# Patient Record
Sex: MALE | Race: OTHER | Employment: FULL TIME | URBAN - METROPOLITAN AREA
[De-identification: names, ages, dates, MRNs, and addresses within clinical notes are randomized per-mention and may not be internally consistent; named-entity substitution may affect disease eponyms.]

---

## 2017-04-27 ENCOUNTER — HOSPITAL ENCOUNTER (OUTPATIENT)
Age: 27
Discharge: HOME | End: 2017-04-27
Attending: EMERGENCY MEDICINE
Payer: COMMERCIAL

## 2017-04-27 ENCOUNTER — TELEPHONE (OUTPATIENT)
Dept: FAMILY MEDICINE CLINIC | Facility: CLINIC | Age: 27
End: 2017-04-27

## 2017-04-27 VITALS
DIASTOLIC BLOOD PRESSURE: 83 MMHG | SYSTOLIC BLOOD PRESSURE: 121 MMHG | TEMPERATURE: 99 F | HEIGHT: 72 IN | OXYGEN SATURATION: 98 % | BODY MASS INDEX: 30.07 KG/M2 | RESPIRATION RATE: 16 BRPM | HEART RATE: 82 BPM | WEIGHT: 222 LBS

## 2017-04-27 DIAGNOSIS — L01.00 IMPETIGO: Primary | ICD-10-CM

## 2017-04-27 PROCEDURE — 99213 OFFICE O/P EST LOW 20 MIN: CPT

## 2017-04-27 PROCEDURE — 99214 OFFICE O/P EST MOD 30 MIN: CPT

## 2017-04-27 NOTE — TELEPHONE ENCOUNTER
Actions Requested: seeking appt after 5p; pt advised [facility] today; [facility]  Situation/Background   Problem: umbilicus swelling.     Onset: swelling for 1 week, discharge began yesterday   Associated Symptoms: pt reports no improvement to

## 2017-04-27 NOTE — TELEPHONE ENCOUNTER
Patient states it is his belly button, swollen and it has a discharge, it is red and itchy. Patient asking what to do.   I tried to book a office visit but he needs something after 5p,

## 2017-04-27 NOTE — ED INITIAL ASSESSMENT (HPI)
Belly button was itchy for a week. Went to [facility] and came back and noticed belly button was infected and pus discharge. Just itchy now.

## 2017-04-28 NOTE — TELEPHONE ENCOUNTER
Follow up call placed to pt. Was seen in [facility], given some cream and told to follow up if no better by Monday 5/1. Pt verbalizes satisfaction with treatment plan, denies further questions.

## 2017-04-28 NOTE — ED PROVIDER NOTES
Patient Seen in: [facility] In [address] [place] [address]    History   Patient presents with: Infection    Stated Complaint: swollen belly button    HPI    17-year-old male presents for evaluation of swollen bellybutton.   Patient reports itching, ir %   O2 Device 04/27/17 0034 None (Room air)       Current:/83 mmHg  Pulse 82  Temp(Src) 98.5 °F (36.9 °C) (Oral)  Resp 16  Ht 182.9 cm (6')  Wt 100.699 kg  BMI 30.10 kg/m2  SpO2 98%        Physical Exam   Constitutional: He is oriented to person, octavia

## 2017-09-02 ENCOUNTER — NURSE TRIAGE (OUTPATIENT)
Dept: OTHER | Age: 27
End: 2017-09-02

## 2017-09-02 NOTE — TELEPHONE ENCOUNTER
Action Requested: Summary for Provider     []  Critical Lab, Recommendations Needed  [] Need Additional Advice  []   FYI    []   Need Orders  [] Need Medications Sent to Pharmacy  []  Other     SUMMARY:   Patient will go to the IC if needed over the weeken

## 2017-09-05 NOTE — TELEPHONE ENCOUNTER
Pt states that he is feeling much better. Symptoms have greatly improved. Pt did not need to go to the IC.

## 2018-03-02 ENCOUNTER — OFFICE VISIT (OUTPATIENT)
Dept: FAMILY MEDICINE CLINIC | Facility: CLINIC | Age: 28
End: 2018-03-02

## 2018-03-02 VITALS
SYSTOLIC BLOOD PRESSURE: 118 MMHG | DIASTOLIC BLOOD PRESSURE: 77 MMHG | TEMPERATURE: 98 F | BODY MASS INDEX: 31.22 KG/M2 | WEIGHT: 223 LBS | HEART RATE: 56 BPM | HEIGHT: 71 IN

## 2018-03-02 DIAGNOSIS — L03.316 CELLULITIS, UMBILICAL: Primary | ICD-10-CM

## 2018-03-02 PROCEDURE — 99212 OFFICE O/P EST SF 10 MIN: CPT | Performed by: FAMILY MEDICINE

## 2018-03-02 RX ORDER — MUPIROCIN CALCIUM 20 MG/G
CREAM TOPICAL
Qty: 15 G | Refills: 1 | Status: SHIPPED | OUTPATIENT
Start: 2018-03-02 | End: 2018-03-07 | Stop reason: CLARIF

## 2018-03-02 NOTE — PROGRESS NOTES
HPI:    Patient ID: Kandis Gutierrez is a 32year old male.     HPI  Patient presents with:  Abdomen/Flank Pain (GI/): belly bottem drainage with swelling for 3 wks   was in hot tub and swimming pools in [facility]  Review of Systems   Constitutional: N

## 2018-03-07 ENCOUNTER — TELEPHONE (OUTPATIENT)
Dept: FAMILY MEDICINE CLINIC | Facility: CLINIC | Age: 28
End: 2018-03-07

## 2018-03-07 NOTE — TELEPHONE ENCOUNTER
Pharmacy stts script needs to be changed to ointment because insurance will not cover cream        Mupirocin Calcium 2 % External Cream 15 g 1 3/2/2018    Sig :  Apply twice daily for up to 10 days     Route:   (none)     Order #:   [id number]

## 2018-03-23 ENCOUNTER — OFFICE VISIT (OUTPATIENT)
Dept: FAMILY MEDICINE CLINIC | Facility: CLINIC | Age: 28
End: 2018-03-23

## 2018-03-23 ENCOUNTER — LAB ENCOUNTER (OUTPATIENT)
Dept: LAB | Age: 28
End: 2018-03-23
Attending: FAMILY MEDICINE
Payer: MEDICAID

## 2018-03-23 VITALS
TEMPERATURE: 97 F | HEART RATE: 57 BPM | HEIGHT: 72.5 IN | SYSTOLIC BLOOD PRESSURE: 122 MMHG | WEIGHT: 220.5 LBS | BODY MASS INDEX: 29.54 KG/M2 | DIASTOLIC BLOOD PRESSURE: 82 MMHG

## 2018-03-23 DIAGNOSIS — Z00.00 ADULT GENERAL MEDICAL EXAM: ICD-10-CM

## 2018-03-23 DIAGNOSIS — Z23 NEED FOR VACCINATION: ICD-10-CM

## 2018-03-23 DIAGNOSIS — Z00.00 ADULT GENERAL MEDICAL EXAM: Primary | ICD-10-CM

## 2018-03-23 DIAGNOSIS — J30.1 SEASONAL ALLERGIC RHINITIS DUE TO POLLEN: ICD-10-CM

## 2018-03-23 DIAGNOSIS — K42.9 UMBILICAL HERNIA WITHOUT OBSTRUCTION AND WITHOUT GANGRENE: ICD-10-CM

## 2018-03-23 LAB
ALBUMIN SERPL BCP-MCNC: 4.5 G/DL (ref 3.5–4.8)
ALBUMIN/GLOB SERPL: 1.4 {RATIO} (ref 1–2)
ALP SERPL-CCNC: 82 U/L (ref 32–100)
ALT SERPL-CCNC: 28 U/L (ref 17–63)
ANION GAP SERPL CALC-SCNC: 10 MMOL/L (ref 0–18)
AST SERPL-CCNC: 22 U/L (ref 15–41)
BASOPHILS # BLD: 0 K/UL (ref 0–0.2)
BASOPHILS NFR BLD: 1 %
BILIRUB SERPL-MCNC: 0.6 MG/DL (ref 0.3–1.2)
BUN SERPL-MCNC: 12 MG/DL (ref 8–20)
BUN/CREAT SERPL: 11.5 (ref 10–20)
CALCIUM SERPL-MCNC: 9.6 MG/DL (ref 8.5–10.5)
CHLORIDE SERPL-SCNC: 105 MMOL/L (ref 95–110)
CHOLEST SERPL-MCNC: 163 MG/DL (ref 110–200)
CO2 SERPL-SCNC: 26 MMOL/L (ref 22–32)
CREAT SERPL-MCNC: 1.04 MG/DL (ref 0.5–1.5)
EOSINOPHIL # BLD: 0.3 K/UL (ref 0–0.7)
EOSINOPHIL NFR BLD: 5 %
ERYTHROCYTE [DISTWIDTH] IN BLOOD BY AUTOMATED COUNT: 14.2 % (ref 11–15)
GLOBULIN PLAS-MCNC: 3.2 G/DL (ref 2.5–3.7)
GLUCOSE SERPL-MCNC: 92 MG/DL (ref 70–99)
HCT VFR BLD AUTO: 46.4 % (ref 41–52)
HDLC SERPL-MCNC: 32 MG/DL
HGB BLD-MCNC: 15.6 G/DL (ref 13.5–17.5)
LDLC SERPL CALC-MCNC: 106 MG/DL (ref 0–99)
LYMPHOCYTES # BLD: 2 K/UL (ref 1–4)
LYMPHOCYTES NFR BLD: 35 %
MCH RBC QN AUTO: 30.3 PG (ref 27–32)
MCHC RBC AUTO-ENTMCNC: 33.7 G/DL (ref 32–37)
MCV RBC AUTO: 89.7 FL (ref 80–100)
MONOCYTES # BLD: 0.4 K/UL (ref 0–1)
MONOCYTES NFR BLD: 7 %
NEUTROPHILS # BLD AUTO: 2.9 K/UL (ref 1.8–7.7)
NEUTROPHILS NFR BLD: 52 %
NONHDLC SERPL-MCNC: 131 MG/DL
OSMOLALITY UR CALC.SUM OF ELEC: 291 MOSM/KG (ref 275–295)
PATIENT FASTING: YES
PLATELET # BLD AUTO: 219 K/UL (ref 140–400)
PMV BLD AUTO: 7 FL (ref 7.4–10.3)
POTASSIUM SERPL-SCNC: 4.7 MMOL/L (ref 3.3–5.1)
PROT SERPL-MCNC: 7.7 G/DL (ref 5.9–8.4)
RBC # BLD AUTO: 5.17 M/UL (ref 4.5–5.9)
SODIUM SERPL-SCNC: 141 MMOL/L (ref 136–144)
TRIGL SERPL-MCNC: 125 MG/DL (ref 1–149)
TSH SERPL-ACNC: 1.69 UIU/ML (ref 0.45–5.33)
WBC # BLD AUTO: 5.5 K/UL (ref 4–11)

## 2018-03-23 PROCEDURE — 80061 LIPID PANEL: CPT

## 2018-03-23 PROCEDURE — 99395 PREV VISIT EST AGE 18-39: CPT | Performed by: FAMILY MEDICINE

## 2018-03-23 PROCEDURE — 80053 COMPREHEN METABOLIC PANEL: CPT

## 2018-03-23 PROCEDURE — 84443 ASSAY THYROID STIM HORMONE: CPT

## 2018-03-23 PROCEDURE — 85025 COMPLETE CBC W/AUTO DIFF WBC: CPT

## 2018-03-23 PROCEDURE — 90471 IMMUNIZATION ADMIN: CPT | Performed by: FAMILY MEDICINE

## 2018-03-23 PROCEDURE — 90715 TDAP VACCINE 7 YRS/> IM: CPT | Performed by: FAMILY MEDICINE

## 2018-03-23 PROCEDURE — 36415 COLL VENOUS BLD VENIPUNCTURE: CPT

## 2018-03-23 PROCEDURE — 80050 GENERAL HEALTH PANEL: CPT

## 2018-03-23 NOTE — PROGRESS NOTES
Patient ID: Keira Crowell is a 32year old male. HPI  Patient presents with:  Physical: Here today for annual physical.    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     He states for some time he had an abnormality of his umbilicus but he 11/12/2013   GLUUR NEG 11/12/2013   BILUR NEG 11/12/2013   KETUR NEG 11/12/2013   BLOODURINE TR (A) 11/12/2013   PHURINE 5.0 11/12/2013   PROUR NEG 11/12/2013   UROBILINOGEN <2.0 11/12/2013   NITRITE NEG 11/12/2013   LEUUR SM (A) 11/12/2013   ASCACIDURINE and hematuria. Musculoskeletal: Negative for joint swelling. Skin: Negative for rash. Allergic/Immunologic: Positive for environmental allergies (zyrtec prn). Neurological: Negative for dizziness, seizures, syncope, numbness and headaches.    Hemato are normal. There is no hepatosplenomegaly. There is no tenderness. Umbilicus: He does have a umbilical hernia at the 7 o'clock position and this is tender to palpation. It does not reduce. There is no drainage, cellulitis, discharge at this time.   Lym patient/family member understands and agrees to plan. Miguel Estrada, DO  3/23/2018      .

## 2018-03-23 NOTE — PROGRESS NOTES
Rush Bearden is a 32year old male who was brought in for this visit. History was provided by the CAREGIVER.   HPI:   Patient presents with:  Physical: Here today for annual physical.      Immunizations    Immunization History  Administered no chest pain, no sports injuries; Other: ***    PHYSICAL EXAM:   /82   Pulse 57   Temp (!) 97.3 °F (36.3 °C) (Oral)   Ht 6' 0.5\" (1.842 m)   Wt 220 lb 8 oz (100 kg)   BMI 29.49 kg/m²   Body mass index is 29.49 kg/m².   Normalized BMI data available types were placed in this encounter.         3/23/2018  Amari Moyer DO

## 2018-04-05 ENCOUNTER — OFFICE VISIT (OUTPATIENT)
Dept: SURGERY | Facility: CLINIC | Age: 28
End: 2018-04-05

## 2018-04-05 VITALS — HEIGHT: 72 IN | BODY MASS INDEX: 30.2 KG/M2 | WEIGHT: 223 LBS

## 2018-04-05 DIAGNOSIS — R19.09 UMBILICAL SWELLING: Primary | ICD-10-CM

## 2018-04-05 PROCEDURE — 99212 OFFICE O/P EST SF 10 MIN: CPT | Performed by: SURGERY

## 2018-04-05 PROCEDURE — 99244 OFF/OP CNSLTJ NEW/EST MOD 40: CPT | Performed by: SURGERY

## 2018-04-06 NOTE — PROGRESS NOTES
History and Physical      Ruby Hilario is a 29year old male. HPI   Patient presents with:  Hernia: Pt referred by Dr. Estelle Desouza regarding possible umbilical hernia x 1 yr.   Pt states he was treated for umbilical infection last yr with topical crea comprehensive 10 point review of systems was completed. Pertinent positives and negatives noted in the the HPI. PHYSICAL EXAM   Body mass index is 30.24 kg/m². No LMP for male patient.   Constitutional: appears well hydrated alert and responsive no

## 2019-05-04 ENCOUNTER — OFFICE VISIT (OUTPATIENT)
Dept: FAMILY MEDICINE CLINIC | Facility: CLINIC | Age: 29
End: 2019-05-04
Payer: COMMERCIAL

## 2019-05-04 VITALS
DIASTOLIC BLOOD PRESSURE: 84 MMHG | TEMPERATURE: 98 F | HEART RATE: 64 BPM | SYSTOLIC BLOOD PRESSURE: 136 MMHG | WEIGHT: 218 LBS | BODY MASS INDEX: 29.53 KG/M2 | HEIGHT: 72 IN

## 2019-05-04 DIAGNOSIS — J30.1 SEASONAL ALLERGIC RHINITIS DUE TO POLLEN: Primary | ICD-10-CM

## 2019-05-04 DIAGNOSIS — J02.9 SORE THROAT: ICD-10-CM

## 2019-05-04 DIAGNOSIS — R05.9 COUGH: ICD-10-CM

## 2019-05-04 PROCEDURE — 87880 STREP A ASSAY W/OPTIC: CPT | Performed by: NURSE PRACTITIONER

## 2019-05-04 PROCEDURE — 99213 OFFICE O/P EST LOW 20 MIN: CPT | Performed by: NURSE PRACTITIONER

## 2019-05-04 RX ORDER — LEVOCETIRIZINE DIHYDROCHLORIDE 5 MG/1
5 TABLET, FILM COATED ORAL EVERY EVENING
Qty: 90 TABLET | Refills: 1 | Status: SHIPPED | OUTPATIENT
Start: 2019-05-04 | End: 2020-12-20

## 2019-05-04 NOTE — PROGRESS NOTES
ALLERGIC RHINITIS    -Take otc allergy medications as directed (over the counter, generic Claritin, Zyrtec or Allegra)    -use of fluticasone nasal spray as advised (Flonase)-this is now available as a generic, over the counter spray (fluticasone) if your 03/23/2019  Influenza Vaccine(Season Ended) due on 09/01/2019    No LMP for male patient. Past Medical History:   Diagnosis Date   • Myopia with astigmatism 2009   • Seasonal allergies        .   Past Surgical History:   Procedure Laterality Date   • Lap Physically abused: Not on file        Forced sexual activity: Not on file    Other Topics      Concerns:         Service: Not Asked        Blood Transfusions: Not Asked        Caffeine Concern: Yes          coffee, 1 cup daily        Occupationa behavior is normal. Judgment and thought content normal.       Assessment and Plan:  Problem List Items Addressed This Visit        Immune    Allergic rhinitis - Primary     Discontinue Zyrtec. Start Xyzal and flonase daily.            Relevant Medications

## 2020-12-20 ENCOUNTER — OFFICE VISIT (OUTPATIENT)
Dept: FAMILY MEDICINE CLINIC | Facility: CLINIC | Age: 30
End: 2020-12-20
Payer: COMMERCIAL

## 2020-12-20 VITALS
SYSTOLIC BLOOD PRESSURE: 139 MMHG | WEIGHT: 219 LBS | BODY MASS INDEX: 29.66 KG/M2 | RESPIRATION RATE: 16 BRPM | OXYGEN SATURATION: 99 % | HEIGHT: 72 IN | HEART RATE: 85 BPM | DIASTOLIC BLOOD PRESSURE: 75 MMHG | TEMPERATURE: 97 F

## 2020-12-20 DIAGNOSIS — R05.8 COUGH WITH EXPOSURE TO COVID-19 VIRUS: Primary | ICD-10-CM

## 2020-12-20 DIAGNOSIS — Z20.822 COUGH WITH EXPOSURE TO COVID-19 VIRUS: Primary | ICD-10-CM

## 2020-12-20 PROCEDURE — 3008F BODY MASS INDEX DOCD: CPT | Performed by: NURSE PRACTITIONER

## 2020-12-20 PROCEDURE — 99202 OFFICE O/P NEW SF 15 MIN: CPT | Performed by: NURSE PRACTITIONER

## 2020-12-20 PROCEDURE — 3075F SYST BP GE 130 - 139MM HG: CPT | Performed by: NURSE PRACTITIONER

## 2020-12-20 PROCEDURE — 3078F DIAST BP <80 MM HG: CPT | Performed by: NURSE PRACTITIONER

## 2020-12-20 RX ORDER — CETIRIZINE HYDROCHLORIDE 10 MG/1
10 TABLET ORAL DAILY
COMMUNITY

## 2020-12-20 NOTE — PATIENT INSTRUCTIONS
Adult Self-Care for Colds  Colds are caused by viruses. They can't be cured with antibiotics. But you can ease symptoms and support your body's efforts to heal itself.  No matter which symptoms you have, be sure to:  · Drink plenty of fluids (water or c · As your appetite returns, you can go back to your normal diet. Ask your healthcare provider if there are any foods you should not have.     When to call your healthcare provider  When you first notice symptoms, ask your provider if you should take antivir Quarantine (for anyone in close contact with someone who has COVID-19)  Anyone who has been in close contact with someone who has COVID-19 should quarantine at home for 14 days from the time of exposure and follow the below recommendations.   If you test po CDC continues to endorse quarantine for 14 days and recognizes that any quarantine shorter than 14 days balances reduced burden against a small possibility of spreading the virus. 10 Ways to Manage Your Health at Home      1.  Stay home from work, school If you have not been exposed or are not aware of an exposure to COVID-19 and are concerned about your symptoms, please contact your health care provider with any questions.     Home Isolation  If you have tested positive for COVID-19, you should remain unde Convalescent plasma is a component of blood that, in people who have recovered from COVID-19, contains antibodies against the virus.  The antibodies in plasma can be used as a treatment for patients in our community who are most severely affected by the vir

## 2020-12-20 NOTE — PROGRESS NOTES
CHIEF COMPLAINT:   Patient presents with:  Covid: COVID exposure with mild symptoms - Entered by patient      HPI:   Carlin Goins is a 27year old male who presents for covid testing. Was exposed 7 days ago.   Reports normal allergy symptoms that i SKIN: no rashes or abnormal skin lesions  HEENT: See HPI  LUNGS: See HPI  CARDIOVASCULAR: denies chest pain or palpitations   GI: denies N/V/C or abdominal pain      EXAM:   /75   Pulse 85   Temp 96.5 °F (35.8 °C) (Tympanic)   Resp 16   Ht 6' (1.829 Colds are caused by viruses. They can't be cured with antibiotics. But you can ease symptoms and support your body's efforts to heal itself.  No matter which symptoms you have, be sure to:  · Drink plenty of fluids (water or clear soup)  · Stop smoking and When to call your healthcare provider  When you first notice symptoms, ask your provider if you should take antiviral medicines. Antibiotics should not be taken for colds or flu.  Also call your provider if you have any of these symptoms:  · You don't feel Anyone who has been in close contact with someone who has COVID-19 should quarantine at home for 14 days from the time of exposure and follow the below recommendations.   If you test positive for COVID-19, you should notify your family and friends with whom CDC continues to endorse quarantine for 14 days and recognizes that any quarantine shorter than 14 days balances reduced burden against a small possibility of spreading the virus. 10 Ways to Manage Your Health at Home      1.  Stay home from work, school If you have not been exposed or are not aware of an exposure to COVID-19 and are concerned about your symptoms, please contact your health care provider with any questions.     Home Isolation  If you have tested positive for COVID-19, you should remain unde Convalescent plasma is a component of blood that, in people who have recovered from COVID-19, contains antibodies against the virus.  The antibodies in plasma can be used as a treatment for patients in our community who are most severely affected by the vir

## 2021-03-09 DIAGNOSIS — Z23 NEED FOR VACCINATION: ICD-10-CM

## 2021-03-10 ENCOUNTER — IMMUNIZATION (OUTPATIENT)
Dept: LAB | Age: 31
End: 2021-03-10
Attending: HOSPITALIST
Payer: COMMERCIAL

## 2021-03-10 DIAGNOSIS — Z23 NEED FOR VACCINATION: Primary | ICD-10-CM

## 2021-03-10 PROCEDURE — 0001A SARSCOV2 VAC 30MCG/0.3ML IM: CPT

## 2021-03-31 ENCOUNTER — IMMUNIZATION (OUTPATIENT)
Dept: LAB | Age: 31
End: 2021-03-31
Attending: HOSPITALIST
Payer: COMMERCIAL

## 2021-03-31 DIAGNOSIS — Z23 NEED FOR VACCINATION: Primary | ICD-10-CM

## 2021-03-31 PROCEDURE — 0002A SARSCOV2 VAC 30MCG/0.3ML IM: CPT

## 2021-11-15 ENCOUNTER — IMMUNIZATION (OUTPATIENT)
Dept: LAB | Facility: HOSPITAL | Age: 31
End: 2021-11-15
Attending: EMERGENCY MEDICINE
Payer: COMMERCIAL

## 2021-11-15 DIAGNOSIS — Z23 NEED FOR VACCINATION: Primary | ICD-10-CM

## 2021-11-15 PROCEDURE — 0004A SARSCOV2 VAC 30MCG/0.3ML IM: CPT

## 2021-12-29 ENCOUNTER — TELEPHONE (OUTPATIENT)
Dept: FAMILY MEDICINE CLINIC | Facility: CLINIC | Age: 31
End: 2021-12-29

## 2021-12-29 DIAGNOSIS — Z11.52 ENCOUNTER FOR SCREENING FOR COVID-19: Primary | ICD-10-CM

## 2021-12-30 ENCOUNTER — LAB ENCOUNTER (OUTPATIENT)
Dept: LAB | Age: 31
End: 2021-12-30
Attending: FAMILY MEDICINE
Payer: COMMERCIAL

## 2021-12-30 DIAGNOSIS — Z11.52 ENCOUNTER FOR SCREENING FOR COVID-19: ICD-10-CM

## 2022-01-01 LAB — SARS-COV-2 RNA RESP QL NAA+PROBE: NOT DETECTED

## 2022-07-15 ENCOUNTER — OFFICE VISIT (OUTPATIENT)
Dept: FAMILY MEDICINE CLINIC | Facility: CLINIC | Age: 32
End: 2022-07-15
Payer: COMMERCIAL

## 2022-07-15 ENCOUNTER — NURSE TRIAGE (OUTPATIENT)
Dept: FAMILY MEDICINE CLINIC | Facility: CLINIC | Age: 32
End: 2022-07-15

## 2022-07-15 VITALS
WEIGHT: 232 LBS | HEIGHT: 72 IN | DIASTOLIC BLOOD PRESSURE: 86 MMHG | HEART RATE: 54 BPM | BODY MASS INDEX: 31.42 KG/M2 | SYSTOLIC BLOOD PRESSURE: 143 MMHG

## 2022-07-15 DIAGNOSIS — B02.9 HERPES ZOSTER WITHOUT COMPLICATION: Primary | ICD-10-CM

## 2022-07-15 PROCEDURE — 3079F DIAST BP 80-89 MM HG: CPT | Performed by: FAMILY MEDICINE

## 2022-07-15 PROCEDURE — 99213 OFFICE O/P EST LOW 20 MIN: CPT | Performed by: FAMILY MEDICINE

## 2022-07-15 PROCEDURE — 3008F BODY MASS INDEX DOCD: CPT | Performed by: FAMILY MEDICINE

## 2022-07-15 PROCEDURE — 3077F SYST BP >= 140 MM HG: CPT | Performed by: FAMILY MEDICINE

## 2022-07-15 RX ORDER — VALACYCLOVIR HYDROCHLORIDE 1 G/1
TABLET, FILM COATED ORAL
Qty: 21 TABLET | Refills: 0 | Status: SHIPPED | OUTPATIENT
Start: 2022-07-15

## 2022-07-15 NOTE — PROGRESS NOTES
Blood pressure 143/86, pulse 54, height 6' (1.829 m), weight 232 lb (105.2 kg). Complaining of a cluster of pimples he noticed around his back and left side. Noticed them yesterday.     Objective grouped vesicles noted T11 dermatome left    Assessment shingles    Plan Valtrex printed information given follow-up if no improvement

## 2022-07-25 ENCOUNTER — TELEPHONE (OUTPATIENT)
Dept: FAMILY MEDICINE CLINIC | Facility: CLINIC | Age: 32
End: 2022-07-25

## 2022-07-25 NOTE — TELEPHONE ENCOUNTER
Dr. Hussain Andres will be off from 8/8-8/15 he can schedule with Talib Serrano for a sooner apt if he prefers. Please call and inform patient. Thank you!

## 2022-08-03 ENCOUNTER — OFFICE VISIT (OUTPATIENT)
Dept: FAMILY MEDICINE CLINIC | Facility: CLINIC | Age: 32
End: 2022-08-03
Payer: COMMERCIAL

## 2022-08-03 VITALS
HEIGHT: 72 IN | DIASTOLIC BLOOD PRESSURE: 80 MMHG | SYSTOLIC BLOOD PRESSURE: 132 MMHG | HEART RATE: 57 BPM | BODY MASS INDEX: 30.75 KG/M2 | WEIGHT: 227 LBS

## 2022-08-03 DIAGNOSIS — G47.9 SLEEP DISTURBANCE: ICD-10-CM

## 2022-08-03 DIAGNOSIS — R03.0 ELEVATED BLOOD PRESSURE READING: ICD-10-CM

## 2022-08-03 DIAGNOSIS — Z56.6 STRESS AT WORK: ICD-10-CM

## 2022-08-03 DIAGNOSIS — Z00.00 WELL ADULT EXAM: Primary | ICD-10-CM

## 2022-08-03 PROCEDURE — 3075F SYST BP GE 130 - 139MM HG: CPT | Performed by: NURSE PRACTITIONER

## 2022-08-03 PROCEDURE — 99395 PREV VISIT EST AGE 18-39: CPT | Performed by: NURSE PRACTITIONER

## 2022-08-03 PROCEDURE — 3008F BODY MASS INDEX DOCD: CPT | Performed by: NURSE PRACTITIONER

## 2022-08-03 PROCEDURE — 3079F DIAST BP 80-89 MM HG: CPT | Performed by: NURSE PRACTITIONER

## 2022-08-03 SDOH — HEALTH STABILITY - MENTAL HEALTH: OTHER PHYSICAL AND MENTAL STRAIN RELATED TO WORK: Z56.6

## 2022-08-06 PROBLEM — Z56.6 STRESS AT WORK: Status: ACTIVE | Noted: 2022-08-06

## 2022-08-06 PROBLEM — R03.0 ELEVATED BLOOD PRESSURE READING: Status: ACTIVE | Noted: 2022-08-06

## 2022-08-06 PROBLEM — G47.9 SLEEP DISTURBANCE: Status: ACTIVE | Noted: 2022-08-06

## 2022-08-06 NOTE — ASSESSMENT & PLAN NOTE
Encourage stress reduction  Encourage good sleep hygiene  Please call if symptoms worsen or are not resolving.

## 2022-08-06 NOTE — ASSESSMENT & PLAN NOTE
Check bp occasionally  Encourage stress reduction  Please aim to eat a diet high in fresh fruits and vegetables, lean protein sources, complex carbohydrates and limited processed and fast foods. Try to get at least 150 minutes of exercise per week-a combination of weight resistance and cardio is preferred.

## 2022-08-06 NOTE — ASSESSMENT & PLAN NOTE
Screening labs  Please aim to eat a diet high in fresh fruits and vegetables, lean protein sources, complex carbohydrates and limited processed and fast foods. Try to get at least 150 minutes of exercise per week-a combination of weight resistance and cardio is preferred.     Up to date vaccines  Encourage stress reduction

## 2022-11-30 ENCOUNTER — TELEMEDICINE (OUTPATIENT)
Dept: TELEHEALTH | Age: 32
End: 2022-11-30

## 2022-11-30 ENCOUNTER — E-VISIT (OUTPATIENT)
Dept: TELEHEALTH | Age: 32
End: 2022-11-30

## 2022-11-30 ENCOUNTER — OFFICE VISIT (OUTPATIENT)
Dept: FAMILY MEDICINE CLINIC | Facility: CLINIC | Age: 32
End: 2022-11-30
Payer: COMMERCIAL

## 2022-11-30 VITALS
HEIGHT: 72 IN | BODY MASS INDEX: 31.15 KG/M2 | DIASTOLIC BLOOD PRESSURE: 81 MMHG | WEIGHT: 230 LBS | OXYGEN SATURATION: 99 % | TEMPERATURE: 99 F | SYSTOLIC BLOOD PRESSURE: 128 MMHG | RESPIRATION RATE: 20 BRPM | HEART RATE: 95 BPM

## 2022-11-30 DIAGNOSIS — R68.89 FLU-LIKE SYMPTOMS: Primary | ICD-10-CM

## 2022-11-30 DIAGNOSIS — H10.32 ACUTE CONJUNCTIVITIS OF LEFT EYE, UNSPECIFIED ACUTE CONJUNCTIVITIS TYPE: ICD-10-CM

## 2022-11-30 DIAGNOSIS — Z02.9 ADMINISTRATIVE ENCOUNTER: Primary | ICD-10-CM

## 2022-11-30 DIAGNOSIS — Z02.9 ENCOUNTERS FOR ADMINISTRATIVE PURPOSE: Primary | ICD-10-CM

## 2022-11-30 PROCEDURE — 3074F SYST BP LT 130 MM HG: CPT | Performed by: NURSE PRACTITIONER

## 2022-11-30 PROCEDURE — 99213 OFFICE O/P EST LOW 20 MIN: CPT | Performed by: NURSE PRACTITIONER

## 2022-11-30 PROCEDURE — 3079F DIAST BP 80-89 MM HG: CPT | Performed by: NURSE PRACTITIONER

## 2022-11-30 PROCEDURE — 3008F BODY MASS INDEX DOCD: CPT | Performed by: NURSE PRACTITIONER

## 2022-11-30 PROCEDURE — 87637 SARSCOV2&INF A&B&RSV AMP PRB: CPT | Performed by: NURSE PRACTITIONER

## 2022-11-30 RX ORDER — OFLOXACIN 3 MG/ML
1 SOLUTION/ DROPS OPHTHALMIC 4 TIMES DAILY
Qty: 1 EACH | Refills: 0 | Status: SHIPPED | OUTPATIENT
Start: 2022-11-30

## 2022-11-30 NOTE — PROGRESS NOTES
Patient completed an e-visit and it was recommended that he see his provider. Video visit timed out. No charge.

## 2022-12-01 LAB
FLUAV + FLUBV RNA SPEC NAA+PROBE: DETECTED
FLUAV + FLUBV RNA SPEC NAA+PROBE: NOT DETECTED
RSV RNA SPEC NAA+PROBE: NOT DETECTED
SARS-COV-2 RNA RESP QL NAA+PROBE: NOT DETECTED

## 2023-03-10 ENCOUNTER — HOSPITAL ENCOUNTER (EMERGENCY)
Facility: HOSPITAL | Age: 33
Discharge: HOME | End: 2023-03-10
Attending: EMERGENCY MEDICINE
Payer: COMMERCIAL

## 2023-03-10 ENCOUNTER — APPOINTMENT (OUTPATIENT)
Dept: MRI IMAGING | Facility: HOSPITAL | Age: 33
End: 2023-03-10
Attending: EMERGENCY MEDICINE
Payer: COMMERCIAL

## 2023-03-10 ENCOUNTER — OFFICE VISIT (OUTPATIENT)
Dept: FAMILY MEDICINE CLINIC | Facility: CLINIC | Age: 33
End: 2023-03-10
Payer: COMMERCIAL

## 2023-03-10 VITALS
TEMPERATURE: 98 F | RESPIRATION RATE: 18 BRPM | HEIGHT: 72 IN | OXYGEN SATURATION: 97 % | HEART RATE: 72 BPM | BODY MASS INDEX: 31.15 KG/M2 | WEIGHT: 230 LBS | SYSTOLIC BLOOD PRESSURE: 140 MMHG | DIASTOLIC BLOOD PRESSURE: 99 MMHG

## 2023-03-10 VITALS
BODY MASS INDEX: 31.15 KG/M2 | DIASTOLIC BLOOD PRESSURE: 82 MMHG | OXYGEN SATURATION: 98 % | HEIGHT: 72 IN | HEART RATE: 60 BPM | TEMPERATURE: 98 F | SYSTOLIC BLOOD PRESSURE: 110 MMHG | WEIGHT: 230 LBS | RESPIRATION RATE: 16 BRPM

## 2023-03-10 DIAGNOSIS — R93.0 ABNORMAL MRI OF HEAD: ICD-10-CM

## 2023-03-10 DIAGNOSIS — R42 DIZZINESS: ICD-10-CM

## 2023-03-10 DIAGNOSIS — R20.0 LEFT FACIAL NUMBNESS: ICD-10-CM

## 2023-03-10 DIAGNOSIS — Z02.9 ADMINISTRATIVE ENCOUNTER: Primary | ICD-10-CM

## 2023-03-10 DIAGNOSIS — R20.2 PARESTHESIAS: Primary | ICD-10-CM

## 2023-03-10 LAB
ALBUMIN SERPL-MCNC: 4 G/DL (ref 3.4–5)
ALBUMIN/GLOB SERPL: 1 {RATIO} (ref 1–2)
ALP LIVER SERPL-CCNC: 89 U/L
ALT SERPL-CCNC: 40 U/L
ANION GAP SERPL CALC-SCNC: 3 MMOL/L (ref 0–18)
AST SERPL-CCNC: 24 U/L (ref 15–37)
BASOPHILS # BLD AUTO: 0.04 X10(3) UL (ref 0–0.2)
BASOPHILS NFR BLD AUTO: 0.6 %
BILIRUB SERPL-MCNC: 0.3 MG/DL (ref 0.1–2)
BUN BLD-MCNC: 21 MG/DL (ref 7–18)
BUN/CREAT SERPL: 16.7 (ref 10–20)
CALCIUM BLD-MCNC: 9.5 MG/DL (ref 8.5–10.1)
CHLORIDE SERPL-SCNC: 106 MMOL/L (ref 98–112)
CO2 SERPL-SCNC: 29 MMOL/L (ref 21–32)
CREAT BLD-MCNC: 1.26 MG/DL
DEPRECATED RDW RBC AUTO: 43.9 FL (ref 35.1–46.3)
EOSINOPHIL # BLD AUTO: 0.43 X10(3) UL (ref 0–0.7)
EOSINOPHIL NFR BLD AUTO: 6.7 %
ERYTHROCYTE [DISTWIDTH] IN BLOOD BY AUTOMATED COUNT: 13.2 % (ref 11–15)
GFR SERPLBLD BASED ON 1.73 SQ M-ARVRAT: 78 ML/MIN/1.73M2 (ref 60–?)
GLOBULIN PLAS-MCNC: 4.1 G/DL (ref 2.8–4.4)
GLUCOSE BLD-MCNC: 108 MG/DL (ref 70–99)
GLUCOSE BLDC GLUCOMTR-MCNC: 88 MG/DL (ref 70–99)
HCT VFR BLD AUTO: 43.8 %
HGB BLD-MCNC: 14.8 G/DL
IMM GRANULOCYTES # BLD AUTO: 0.02 X10(3) UL (ref 0–1)
IMM GRANULOCYTES NFR BLD: 0.3 %
LYMPHOCYTES # BLD AUTO: 2.43 X10(3) UL (ref 1–4)
LYMPHOCYTES NFR BLD AUTO: 37.6 %
MCH RBC QN AUTO: 30.5 PG (ref 26–34)
MCHC RBC AUTO-ENTMCNC: 33.8 G/DL (ref 31–37)
MCV RBC AUTO: 90.1 FL
MONOCYTES # BLD AUTO: 0.51 X10(3) UL (ref 0.1–1)
MONOCYTES NFR BLD AUTO: 7.9 %
NEUTROPHILS # BLD AUTO: 3.03 X10 (3) UL (ref 1.5–7.7)
NEUTROPHILS # BLD AUTO: 3.03 X10(3) UL (ref 1.5–7.7)
NEUTROPHILS NFR BLD AUTO: 46.9 %
OSMOLALITY SERPL CALC.SUM OF ELEC: 290 MOSM/KG (ref 275–295)
PLATELET # BLD AUTO: 233 10(3)UL (ref 150–450)
POTASSIUM SERPL-SCNC: 4.2 MMOL/L (ref 3.5–5.1)
PROT SERPL-MCNC: 8.1 G/DL (ref 6.4–8.2)
RBC # BLD AUTO: 4.86 X10(6)UL
SODIUM SERPL-SCNC: 138 MMOL/L (ref 136–145)
TROPONIN I HIGH SENSITIVITY: 16 NG/L
WBC # BLD AUTO: 6.5 X10(3) UL (ref 4–11)

## 2023-03-10 PROCEDURE — 99284 EMERGENCY DEPT VISIT MOD MDM: CPT

## 2023-03-10 PROCEDURE — 84484 ASSAY OF TROPONIN QUANT: CPT | Performed by: EMERGENCY MEDICINE

## 2023-03-10 PROCEDURE — 99285 EMERGENCY DEPT VISIT HI MDM: CPT

## 2023-03-10 PROCEDURE — 3008F BODY MASS INDEX DOCD: CPT | Performed by: NURSE PRACTITIONER

## 2023-03-10 PROCEDURE — 3079F DIAST BP 80-89 MM HG: CPT | Performed by: NURSE PRACTITIONER

## 2023-03-10 PROCEDURE — 70551 MRI BRAIN STEM W/O DYE: CPT | Performed by: EMERGENCY MEDICINE

## 2023-03-10 PROCEDURE — 82962 GLUCOSE BLOOD TEST: CPT

## 2023-03-10 PROCEDURE — 80053 COMPREHEN METABOLIC PANEL: CPT | Performed by: EMERGENCY MEDICINE

## 2023-03-10 PROCEDURE — 3074F SYST BP LT 130 MM HG: CPT | Performed by: NURSE PRACTITIONER

## 2023-03-10 PROCEDURE — 96374 THER/PROPH/DIAG INJ IV PUSH: CPT

## 2023-03-10 PROCEDURE — 85025 COMPLETE CBC W/AUTO DIFF WBC: CPT | Performed by: EMERGENCY MEDICINE

## 2023-03-10 PROCEDURE — 93005 ELECTROCARDIOGRAM TRACING: CPT

## 2023-03-10 PROCEDURE — 93010 ELECTROCARDIOGRAM REPORT: CPT

## 2023-03-10 RX ORDER — DIAZEPAM 5 MG/ML
2.5 INJECTION, SOLUTION INTRAMUSCULAR; INTRAVENOUS ONCE
Status: COMPLETED | OUTPATIENT
Start: 2023-03-10 | End: 2023-03-10

## 2023-03-10 RX ORDER — ASPIRIN 81 MG/1
81 TABLET, CHEWABLE ORAL ONCE
Status: COMPLETED | OUTPATIENT
Start: 2023-03-10 | End: 2023-03-10

## 2023-03-11 NOTE — PROGRESS NOTES
Zeny Harmon is a 28year old male with hx of anxiety who presents to [facility] reporting starting 2-3 hours ago while he was shopping at [employer], he suddenly felt lightheaded (more of a dizziness sensation as if he was intoxicated), \"crossed vision\", nausea, and that a portion of his left face was feeling numb. He did eat a good breakfast but admits hasn't eaten much today, lower appetite. Has still been drinking fluids, urination is normal.  Denies dyspnea, SOB, chest pain, ear pain, syncope/pre-syncope. Denies drug/alcohol use. He was sick with a cold/flu this past week but he is better now. Home COVID test neg. Denies any other chronic medical conditions, no medications. Reports these do not feel like his typical anxiety symptoms. Upon triage, pt is stable/no distress. A&Ox3. RRR without murmur. Lungs CTA, no cough. PERRLA. Cranial nerves grossly intact. Speech and gait appropriate. Vitals stable. Accompanied by: Spouse  After triage, higher acuity of care was recommended to Zeny Harmon today. Rationale: Symptoms warrant labs, possible imaging. Site recommendation: [address] [place] [address] ED  Patient was offered transportation to hospital via EMS but declines. Spouse will drive pt and feels stable to do so. Patient/parent verbalized understanding of rationale for further evaluation and was stable upon discharge. Pt added to ED expectant arrivals board.   Electronically signed,   Jami Solis MSN, APN, FNP-BC  3/10/2023, 6:45 PM

## 2023-03-11 NOTE — CM/SW NOTE
2159:  Called by Dr. Glenn Epstein stating he spoke with Dr. Andres Ward whom requests to see patient in TIA clinic next week. Dr. Glenn Epstein informed to please have pt's RN confirm pt's contact number correct on face sheet and have RN inform patient, Lucy Arroyo will be contacting him on Monday with an expedited appointment in TIA clinic. 2240:  Krissy Menchaca called confirming pt's contact# confirmed [phone]. Krissy Menchaca informed to please inform patient Lucy Arroyo will be contacting him on Monday with an expedited appointment in TIA clinic. Katty Alexander RN v/u and will inform patient of the above.

## 2023-03-11 NOTE — ED INITIAL ASSESSMENT (HPI)
Aox4. Complaints of left sided facial numbness with dizziness (describes as feeling drunk but denies room spinning sensation; denies vertigo-like dizziness). Reports similar symptoms 2 weeks ago that resolved spontaneously. -trauma. BUE, BLE strength equal. No obvious facial droop. No tongue deviation. Ambulatory without difficulty. Speech clear.

## 2023-03-12 LAB
ATRIAL RATE: 61 BPM
P AXIS: 51 DEGREES
P-R INTERVAL: 152 MS
Q-T INTERVAL: 432 MS
QRS DURATION: 112 MS
QTC CALCULATION (BEZET): 434 MS
R AXIS: 3 DEGREES
T AXIS: 38 DEGREES
VENTRICULAR RATE: 61 BPM

## 2023-03-13 ENCOUNTER — TELEPHONE (OUTPATIENT)
Dept: NEUROLOGY | Facility: CLINIC | Age: 33
End: 2023-03-13

## 2023-03-13 DIAGNOSIS — G45.9 TIA (TRANSIENT ISCHEMIC ATTACK): Primary | ICD-10-CM

## 2023-03-13 NOTE — TELEPHONE ENCOUNTER
Patient was told by ER to call and get an appointment asap with Dr. Nadine Stone. Looks like Dr. Audree Cheadle was a Consult; should he be seeing Dr. Audree Cheadle? Should he be double booked for Dr. Audree Cheadle this week or if he should see Dr. Nadine Stone should it be for next week?

## 2023-03-13 NOTE — CM/SW NOTE
Called Dr Vladislav Bledsoe office and spoke with Bertha who stated she will reah out to the patient and schedule him for the [facility]

## 2023-03-13 NOTE — TELEPHONE ENCOUNTER
Received call from  in ED. MD would like pt scheduled for TIA clinic. Pt has not seen neuro previously. Seen in ED on 3/10/23. Patient scheduled for 3/14 with Dr Johnathon Spurling. Echo with bubble order placed.      Echo scheduled for 3/16/23 @ 1:15pm    Called patient to notify & provide with details for appointment

## 2023-03-14 ENCOUNTER — LAB ENCOUNTER (OUTPATIENT)
Dept: LAB | Facility: HOSPITAL | Age: 33
End: 2023-03-14
Attending: Other
Payer: COMMERCIAL

## 2023-03-14 ENCOUNTER — OFFICE VISIT (OUTPATIENT)
Dept: NEUROLOGY | Facility: CLINIC | Age: 33
End: 2023-03-14
Payer: COMMERCIAL

## 2023-03-14 VITALS
HEIGHT: 72 IN | BODY MASS INDEX: 31.15 KG/M2 | WEIGHT: 230 LBS | DIASTOLIC BLOOD PRESSURE: 80 MMHG | SYSTOLIC BLOOD PRESSURE: 122 MMHG

## 2023-03-14 DIAGNOSIS — R93.0 ABNORMAL MRI OF HEAD: ICD-10-CM

## 2023-03-14 DIAGNOSIS — R29.818 TRANSIENT NEUROLOGICAL SYMPTOMS: ICD-10-CM

## 2023-03-14 DIAGNOSIS — R29.818 TRANSIENT NEUROLOGICAL SYMPTOMS: Primary | ICD-10-CM

## 2023-03-14 LAB
HBV CORE AB SERPL QL IA: NONREACTIVE
HBV SURFACE AB SER QL: REACTIVE
HBV SURFACE AB SERPL IA-ACNC: 10.49 MIU/ML
HBV SURFACE AG SER-ACNC: <0.1 [IU]/L
HBV SURFACE AG SERPL QL IA: NONREACTIVE
IGA SERPL-MCNC: 190 MG/DL (ref 70–312)
IGM SERPL-MCNC: 186 MG/DL (ref 43–279)
IMMUNOGLOBULIN PNL SER-MCNC: 1380 MG/DL (ref 791–1643)

## 2023-03-14 PROCEDURE — 86704 HEP B CORE ANTIBODY TOTAL: CPT

## 2023-03-14 PROCEDURE — 82657 ENZYME CELL ACTIVITY: CPT

## 2023-03-14 PROCEDURE — 3008F BODY MASS INDEX DOCD: CPT | Performed by: OTHER

## 2023-03-14 PROCEDURE — 86711 JOHN CUNNINGHAM ANTIBODY: CPT

## 2023-03-14 PROCEDURE — 87340 HEPATITIS B SURFACE AG IA: CPT

## 2023-03-14 PROCEDURE — 86706 HEP B SURFACE ANTIBODY: CPT

## 2023-03-14 PROCEDURE — 3074F SYST BP LT 130 MM HG: CPT | Performed by: OTHER

## 2023-03-14 PROCEDURE — 99204 OFFICE O/P NEW MOD 45 MIN: CPT | Performed by: OTHER

## 2023-03-14 PROCEDURE — 82784 ASSAY IGA/IGD/IGG/IGM EACH: CPT

## 2023-03-14 PROCEDURE — 3079F DIAST BP 80-89 MM HG: CPT | Performed by: OTHER

## 2023-03-14 PROCEDURE — 80503 PATH CLIN CONSLTJ SF 5-20: CPT

## 2023-03-14 PROCEDURE — 36415 COLL VENOUS BLD VENIPUNCTURE: CPT

## 2023-03-15 ENCOUNTER — HOSPITAL ENCOUNTER (OUTPATIENT)
Dept: MRI IMAGING | Facility: HOSPITAL | Age: 33
Discharge: HOME | End: 2023-03-15
Attending: Other
Payer: COMMERCIAL

## 2023-03-15 ENCOUNTER — TELEPHONE (OUTPATIENT)
Dept: NEUROLOGY | Facility: CLINIC | Age: 33
End: 2023-03-15

## 2023-03-15 DIAGNOSIS — R93.0 ABNORMAL MRI OF HEAD: ICD-10-CM

## 2023-03-15 DIAGNOSIS — R29.818 TRANSIENT NEUROLOGICAL SYMPTOMS: ICD-10-CM

## 2023-03-15 PROCEDURE — 72156 MRI NECK SPINE W/O & W/DYE: CPT | Performed by: OTHER

## 2023-03-15 PROCEDURE — 70553 MRI BRAIN STEM W/O & W/DYE: CPT | Performed by: OTHER

## 2023-03-15 PROCEDURE — A9575 INJ GADOTERATE MEGLUMI 0.1ML: HCPCS | Performed by: OTHER

## 2023-03-15 RX ORDER — GADOTERATE MEGLUMINE 376.9 MG/ML
20 INJECTION INTRAVENOUS
Status: COMPLETED | OUTPATIENT
Start: 2023-03-15 | End: 2023-03-15

## 2023-03-15 RX ADMIN — GADOTERATE MEGLUMINE 20 ML: 376.9 INJECTION INTRAVENOUS at 15:37:00

## 2023-03-15 NOTE — TELEPHONE ENCOUNTER
----- Message from Amee Anders MD sent at 3/15/2023  7:35 AM CDT -----  Please let the patient know that that he is immunized against hepatitis B, but there is no current infection    Thank you

## 2023-03-15 NOTE — TELEPHONE ENCOUNTER
I spoke with Gracy Poe, and the patients MRI's have been rescheduled for today at the [facility] and tomorrow at the [address] [place] [address]. I spoke with the patient and he verbalized understanding. Information has been posted to the patients MyChart as well. Message to Dr. Dennie Boxer as Joanne Aranda. Reviewed and electronically signed by:  [address], [facility]

## 2023-03-16 ENCOUNTER — HOSPITAL ENCOUNTER (OUTPATIENT)
Dept: MRI IMAGING | Age: 33
End: 2023-03-16
Attending: Other
Payer: COMMERCIAL

## 2023-03-16 ENCOUNTER — HOSPITAL ENCOUNTER (OUTPATIENT)
Dept: CV DIAGNOSTICS | Facility: HOSPITAL | Age: 33
Discharge: HOME | End: 2023-03-16
Attending: Other
Payer: COMMERCIAL

## 2023-03-16 ENCOUNTER — TELEPHONE (OUTPATIENT)
Dept: ADMINISTRATIVE | Facility: HOSPITAL | Age: 33
End: 2023-03-16

## 2023-03-16 ENCOUNTER — TELEPHONE (OUTPATIENT)
Dept: NEUROLOGY | Facility: CLINIC | Age: 33
End: 2023-03-16

## 2023-03-16 DIAGNOSIS — G35 MS (MULTIPLE SCLEROSIS) (HCC): Primary | ICD-10-CM

## 2023-03-16 DIAGNOSIS — G45.9 TIA (TRANSIENT ISCHEMIC ATTACK): ICD-10-CM

## 2023-03-16 PROCEDURE — 93306 TTE W/DOPPLER COMPLETE: CPT | Performed by: OTHER

## 2023-03-16 NOTE — TELEPHONE ENCOUNTER
----- Message from Payal Ugarte MD sent at 3/16/2023  4:04 PM CDT -----  Please let the patient know that echocardiogram didn't show signs of the a. Fib or any holes that could have contributed to the TIA/stroke symptoms.

## 2023-03-16 NOTE — TELEPHONE ENCOUNTER
Can you clarify with them if there is any need for peer to peer discussion in the patient who has multiple sclerosis?

## 2023-03-16 NOTE — TELEPHONE ENCOUNTER
Detailed message has been left for the infusion center to contact the office. Colten Mclain start form has been completed. Reviewed and electronically signed by:  500 [facility], [address] [place] [address], [facility]

## 2023-03-16 NOTE — TELEPHONE ENCOUNTER
I spoke with the infusion center and they will add the patient on tomorrow. Message to Dr. Mani Abbasi to please place the order for the steroids. After the order has been placed, I will contact the patient. Reviewed and electronically signed by:  [address], [facility]

## 2023-03-16 NOTE — TELEPHONE ENCOUNTER
Could we find out if we can start steroid infusions today or tomorrow for the next 5 days, or is best to start him with oral steroids.     Meantime we need to start the process for applying for Chari Saint

## 2023-03-16 NOTE — TELEPHONE ENCOUNTER
The Brain MRI w+wo and C-Spine MRI w+wo was already completed yesterday 3/15/23    Contacted Jada at [place] who states P2P is required and can still be completed    Routing to [employer]

## 2023-03-16 NOTE — TELEPHONE ENCOUNTER
I spoke with the patient and informed him of the information below. The patient will come in today to sign the form. Reviewed and electronically signed by:  500 [facility], [address] [place] [address], [facility]

## 2023-03-16 NOTE — TELEPHONE ENCOUNTER
Detailed message has been left for the patient to contact the office. Order has been sent to the infusion center. The patient will need to come into the office to sign the K[employer] start form. Form has been placed in the nurse bin. Reviewed and electronically signed by:  [address], [facility]

## 2023-03-17 ENCOUNTER — PATIENT MESSAGE (OUTPATIENT)
Dept: NEUROLOGY | Facility: CLINIC | Age: 33
End: 2023-03-17

## 2023-03-17 PROBLEM — G35 MULTIPLE SCLEROSIS (HCC): Status: ACTIVE | Noted: 2023-03-17

## 2023-03-17 NOTE — TELEPHONE ENCOUNTER
Dr. Arroyo Chavo please review and advise. Reviewed and electronically signed by:  500 [facility], [address] [place] [address], [facility]

## 2023-03-17 NOTE — TELEPHONE ENCOUNTER
From: Frank Saldaña  To: Fahad Mena MD  Sent: 3/17/2023 9:55 AM CDT  Subject: Infusion / baby aspirin / questions    Good morning, just following up. I have not heard from scheduling for the infusion appointments. Just want to make sure nothing has changed. Also, the ER doctor prescribed a daily baby aspirin - should I continue to take daily? Is there any vitamins that i should start taking?

## 2023-03-17 NOTE — TELEPHONE ENCOUNTER
Received fax from the [facility] stating that we need to obtain a PA for the infusions. Contacted [facility] and spoke with [place]. PA is not required - Reference # [id number]. Information has sierra faxed to the [facility]. Reviewed and electronically signed by:  [address], [facility]

## 2023-03-17 NOTE — TELEPHONE ENCOUNTER
There is no need to take a daily aspirin. There is no need to be taking any supplements unless there is a proven deficiency. Multiple sclerosis is not caused by the vitamin deficiencies.   However if he wants, we can start him on multivitamin that includes vitamin D

## 2023-03-20 ENCOUNTER — PATIENT MESSAGE (OUTPATIENT)
Dept: NEUROLOGY | Facility: CLINIC | Age: 33
End: 2023-03-20

## 2023-03-20 ENCOUNTER — TELEPHONE (OUTPATIENT)
Dept: NEUROLOGY | Facility: CLINIC | Age: 33
End: 2023-03-20

## 2023-03-20 LAB — THIOPURINE METHYLTRANSFERASE: 29.7 U/ML

## 2023-03-20 RX ORDER — OFATUMUMAB 20 MG/.4ML
1 INJECTION, SOLUTION SUBCUTANEOUS
Qty: 1 EACH | Refills: 11 | Status: SHIPPED | OUTPATIENT
Start: 2023-03-20 | End: 2023-03-20

## 2023-03-20 RX ORDER — OFATUMUMAB 20 MG/.4ML
1 INJECTION, SOLUTION SUBCUTANEOUS WEEKLY
Qty: 3 EACH | Refills: 0 | Status: SHIPPED | OUTPATIENT
Start: 2023-03-20 | End: 2023-04-17

## 2023-03-20 RX ORDER — OFATUMUMAB 20 MG/.4ML
1 INJECTION, SOLUTION SUBCUTANEOUS
Qty: 1 EACH | Refills: 11 | Status: SHIPPED | OUTPATIENT
Start: 2023-03-20 | End: 2023-04-19

## 2023-03-20 NOTE — TELEPHONE ENCOUNTER
Recv'd call that the start form was received but Section 2 and Section 5 need to be check off and refaxed

## 2023-03-20 NOTE — TELEPHONE ENCOUNTER
From: Patricio Stafford  To: Kaleb Plummer MD  Sent: 3/20/2023 11:40 AM CDT  Subject: [employer] needs prior authorization form    Hello, i received a call from [employer] and mentioned an attempt to contact your office. [employer] is in need of the prior authorization (PA) form. Please return their call or submit the necessary form. Submit PA through [facility] or fax to [phone].      Benedicto Riojas

## 2023-03-20 NOTE — TELEPHONE ENCOUNTER
PA requested via Epic for Kesimpta loading dose and maintenance dosing. Reviewed and electronically signed by:  500 [facility], [address] [place] [address], [facility]

## 2023-03-22 ENCOUNTER — PATIENT MESSAGE (OUTPATIENT)
Dept: NEUROLOGY | Facility: CLINIC | Age: 33
End: 2023-03-22

## 2023-03-22 ENCOUNTER — OFFICE VISIT (OUTPATIENT)
Dept: HEMATOLOGY/ONCOLOGY | Facility: HOSPITAL | Age: 33
End: 2023-03-22
Attending: Other
Payer: COMMERCIAL

## 2023-03-22 VITALS
RESPIRATION RATE: 16 BRPM | TEMPERATURE: 98 F | HEART RATE: 61 BPM | SYSTOLIC BLOOD PRESSURE: 123 MMHG | DIASTOLIC BLOOD PRESSURE: 83 MMHG | OXYGEN SATURATION: 100 %

## 2023-03-22 DIAGNOSIS — G35 MULTIPLE SCLEROSIS (HCC): Primary | ICD-10-CM

## 2023-03-22 PROCEDURE — 96365 THER/PROPH/DIAG IV INF INIT: CPT

## 2023-03-22 NOTE — PROGRESS NOTES
Pt arrived for Solumedrol 1 of 5. He was recently diagnosed MS. Discussed with him the side effects of Solumedrol and the importance of coming everyday. He voiced understanding. He is waiting on Insurance Authorization for [employer]. PIV removed, he prefers to have a new one placed everyday.

## 2023-03-23 ENCOUNTER — OFFICE VISIT (OUTPATIENT)
Dept: HEMATOLOGY/ONCOLOGY | Facility: HOSPITAL | Age: 33
End: 2023-03-23
Attending: FAMILY MEDICINE
Payer: COMMERCIAL

## 2023-03-23 VITALS
HEART RATE: 56 BPM | DIASTOLIC BLOOD PRESSURE: 83 MMHG | OXYGEN SATURATION: 100 % | SYSTOLIC BLOOD PRESSURE: 130 MMHG | TEMPERATURE: 98 F | RESPIRATION RATE: 16 BRPM

## 2023-03-23 DIAGNOSIS — G35 MULTIPLE SCLEROSIS (HCC): Primary | ICD-10-CM

## 2023-03-23 PROCEDURE — 96365 THER/PROPH/DIAG IV INF INIT: CPT

## 2023-03-23 NOTE — TELEPHONE ENCOUNTER
Dr. Nicolás Najera, please review and advise. Thanks. Reviewed and electronically signed by:  500 [facility], [address] [place] [address], [facility]

## 2023-03-23 NOTE — TELEPHONE ENCOUNTER
From: Shelby Lopez  To: Michele Rojas MD  Sent: 3/22/2023 8:33 PM CDT  Subject: Covid and Flu Shot Vaccines     Hello, can you confirm i am up to date with my Covid vaccine? If not, should I boost prior to start of treatment. Also, should I get the flu shot? Let me know your thoughts on these vaccine shots. Thanks.

## 2023-03-23 NOTE — TELEPHONE ENCOUNTER
Would suggest another booster for COVID and shot for the flu.   Also please have him clarify with the PCP what else he is missing

## 2023-03-23 NOTE — PROGRESS NOTES
Pt to infusion for Solumedrol 2 of 5. Arrives ambulating independently. PIV established to left forearm on 2nd attempt - present blood return noted. Solumedrol infused per order, pt appeared to tolerate well. PIV removed per pt preference, site covered with gauze and coban. Discharged from infusion ambulating independently. Aware of future appointments.

## 2023-03-24 ENCOUNTER — IMMUNIZATION (OUTPATIENT)
Dept: LAB | Age: 33
End: 2023-03-24
Attending: EMERGENCY MEDICINE
Payer: COMMERCIAL

## 2023-03-24 ENCOUNTER — OFFICE VISIT (OUTPATIENT)
Dept: HEMATOLOGY/ONCOLOGY | Facility: HOSPITAL | Age: 33
End: 2023-03-24
Attending: Other
Payer: COMMERCIAL

## 2023-03-24 ENCOUNTER — TELEPHONE (OUTPATIENT)
Dept: NEUROLOGY | Facility: CLINIC | Age: 33
End: 2023-03-24

## 2023-03-24 VITALS
OXYGEN SATURATION: 100 % | HEART RATE: 55 BPM | SYSTOLIC BLOOD PRESSURE: 129 MMHG | TEMPERATURE: 99 F | RESPIRATION RATE: 16 BRPM | DIASTOLIC BLOOD PRESSURE: 75 MMHG

## 2023-03-24 DIAGNOSIS — Z23 NEED FOR VACCINATION: Primary | ICD-10-CM

## 2023-03-24 DIAGNOSIS — G35 MULTIPLE SCLEROSIS (HCC): Primary | ICD-10-CM

## 2023-03-24 PROCEDURE — 0124A SARSCOV2 VAC BVL 30MCG/0.3ML: CPT

## 2023-03-24 PROCEDURE — 90471 IMMUNIZATION ADMIN: CPT

## 2023-03-24 PROCEDURE — 96365 THER/PROPH/DIAG IV INF INIT: CPT

## 2023-03-24 PROCEDURE — 90686 IIV4 VACC NO PRSV 0.5 ML IM: CPT

## 2023-03-24 NOTE — TELEPHONE ENCOUNTER
----- Message from Gracy Buitrago MD sent at 3/24/2023  8:47 AM CDT -----  Please let the patient know that results of these particular lab tests so far were normal.    Thank you

## 2023-03-24 NOTE — TELEPHONE ENCOUNTER
I spoke with the patient and informed him of the results. Patient verbalized understanding. Reviewed and electronically signed by:  500 [facility], [address] [place] [address], [facility]

## 2023-03-24 NOTE — PROGRESS NOTES
Pt arrived for IV solumedrol 3 of 5 for MS. States he has tolerated past few days of treatment well but with c/o insomnia. PIV started with good blood return. Solumedrol given over 1 hr-tolerated well. PIV dc'd and pt left ambulating without complaints. Schedule updated for pt received 5/5 on Sunday instead of Monday-agreeable to plan. Aware of weekend procedure.

## 2023-03-25 ENCOUNTER — OFFICE VISIT (OUTPATIENT)
Dept: HEMATOLOGY/ONCOLOGY | Facility: HOSPITAL | Age: 33
End: 2023-03-25
Attending: Other
Payer: COMMERCIAL

## 2023-03-25 VITALS
DIASTOLIC BLOOD PRESSURE: 73 MMHG | RESPIRATION RATE: 16 BRPM | TEMPERATURE: 98 F | OXYGEN SATURATION: 98 % | HEART RATE: 56 BPM | SYSTOLIC BLOOD PRESSURE: 127 MMHG

## 2023-03-25 DIAGNOSIS — G35 MULTIPLE SCLEROSIS (HCC): Primary | ICD-10-CM

## 2023-03-25 PROCEDURE — 96365 THER/PROPH/DIAG IV INF INIT: CPT

## 2023-03-25 NOTE — PROGRESS NOTES
Pt here for IV solumerdol #4/5. Patient states he is still having some neurologic side effects from his MS but he does feel like they are decreasing with IV steroids. Ordering MD: Dr. Carmel Noriega     Pt tolerated infusion without difficulty or complaint. Reviewed next apt date/time.       Education Record    Learner:  Patient    Disease / Diagnosis: Multiple sclerosis    Barriers / Limitations:  None   Comments:    Method:  Discussion   Comments:    General Topics:  Plan of care reviewed   Comments:    Outcome:  Shows understanding   Comments:

## 2023-03-26 ENCOUNTER — OFFICE VISIT (OUTPATIENT)
Dept: HEMATOLOGY/ONCOLOGY | Facility: HOSPITAL | Age: 33
End: 2023-03-26
Attending: Other
Payer: COMMERCIAL

## 2023-03-26 ENCOUNTER — PATIENT MESSAGE (OUTPATIENT)
Dept: NEUROLOGY | Facility: CLINIC | Age: 33
End: 2023-03-26

## 2023-03-26 VITALS
SYSTOLIC BLOOD PRESSURE: 117 MMHG | HEART RATE: 50 BPM | DIASTOLIC BLOOD PRESSURE: 70 MMHG | RESPIRATION RATE: 16 BRPM | OXYGEN SATURATION: 100 % | TEMPERATURE: 98 F

## 2023-03-26 DIAGNOSIS — G35 MULTIPLE SCLEROSIS (HCC): Primary | ICD-10-CM

## 2023-03-26 PROCEDURE — 96365 THER/PROPH/DIAG IV INF INIT: CPT

## 2023-03-26 NOTE — PROGRESS NOTES
Pt here for IV solumedrol #5/5. Pt with some bruising noted to arms from IV starts. No other concerns at this time. Ordering MD: Dr. Connor Ro       Pt tolerated infusion without difficulty or complaint.     Education Record    Learner:  Patient    Disease / Diagnosis: Multiple sclerosis    Barriers / Limitations:  None   Comments:    Method:  Discussion   Comments:    General Topics:  Plan of care reviewed   Comments:    Outcome:  Shows understanding   Comments:

## 2023-03-27 ENCOUNTER — APPOINTMENT (OUTPATIENT)
Dept: HEMATOLOGY/ONCOLOGY | Facility: HOSPITAL | Age: 33
End: 2023-03-27
Attending: Other
Payer: COMMERCIAL

## 2023-03-27 NOTE — TELEPHONE ENCOUNTER
General healthy diet, for example Mediterranean diet is suggested. If he needs help designing the diet we can refer him to dietitian. Of course Cayla Saint will lower the immune system just like any other multiple sclerosis medication will. There are no supplements to boost immune system. Good diet, good exercise, maintaining hygiene and avoiding sick contacts. Generally we advise minimal amount of alcohol. But not because it is anything to do specifically with multiple sclerosis. [employer] has significant amount of information.

## 2023-03-27 NOTE — TELEPHONE ENCOUNTER
Please review and advise. Reviewed and electronically signed by:  500 [facility], [address] [place] [address], [facility]

## 2023-03-28 ENCOUNTER — PATIENT MESSAGE (OUTPATIENT)
Dept: NEUROLOGY | Facility: CLINIC | Age: 33
End: 2023-03-28

## 2023-03-29 NOTE — TELEPHONE ENCOUNTER
Please review and advise. Thanks. Reviewed and electronically signed by:  500 [facility], [address] [place] [address], [facility]

## 2023-04-18 ENCOUNTER — HOSPITAL ENCOUNTER (OUTPATIENT)
Dept: NUTRITION | Facility: HOSPITAL | Age: 33
Discharge: HOME | End: 2023-04-18
Attending: Other
Payer: COMMERCIAL

## 2023-04-18 DIAGNOSIS — G35 MS (MULTIPLE SCLEROSIS) (HCC): ICD-10-CM

## 2023-04-18 PROCEDURE — 97802 MEDICAL NUTRITION INDIV IN: CPT | Performed by: DIETITIAN, REGISTERED

## 2023-09-07 NOTE — TELEPHONE ENCOUNTER
1. \"Have you been to the ER, urgent care clinic since your last visit? Hospitalized since your last visit? \" No    2. \"Have you seen or consulted any other health care providers outside of the [address] [place] [address] since your last visit? \" No     3. For patients aged 43-73: Has the patient had a colonoscopy / FIT/ Cologuard? NA - based on age      If the patient is female:    4. For patients aged 43-66: Has the patient had a mammogram within the past 2 years? Yes - Care Gap present. Most recent result on file      5. For patients aged 21-65: Has the patient had a pap smear?  NA - based on age or sex I think that would be fine.

## 2023-10-24 ENCOUNTER — NURSE TRIAGE (OUTPATIENT)
Dept: FAMILY MEDICINE CLINIC | Facility: CLINIC | Age: 33
End: 2023-10-24

## 2023-10-24 ENCOUNTER — OFFICE VISIT (OUTPATIENT)
Dept: FAMILY MEDICINE CLINIC | Facility: CLINIC | Age: 33
End: 2023-10-24

## 2023-10-24 VITALS
WEIGHT: 179.5 LBS | BODY MASS INDEX: 24 KG/M2 | HEART RATE: 71 BPM | SYSTOLIC BLOOD PRESSURE: 131 MMHG | DIASTOLIC BLOOD PRESSURE: 86 MMHG

## 2023-10-24 DIAGNOSIS — R10.31 RIGHT LOWER QUADRANT ABDOMINAL PAIN: Primary | ICD-10-CM

## 2023-10-24 LAB
APPEARANCE: CLEAR
BILIRUBIN: NEGATIVE
GLUCOSE (URINE DIPSTICK): NEGATIVE MG/DL
KETONES (URINE DIPSTICK): NEGATIVE MG/DL
LEUKOCYTES: NEGATIVE
MULTISTIX LOT#: ABNORMAL NUMERIC
NITRITE, URINE: NEGATIVE
PH, URINE: 7 (ref 4.5–8)
PROTEIN (URINE DIPSTICK): NEGATIVE MG/DL
SPECIFIC GRAVITY: 1.01 (ref 1–1.03)
URINE-COLOR: YELLOW
UROBILINOGEN,SEMI-QN: 0.2 MG/DL (ref 0–1.9)

## 2023-10-24 PROCEDURE — 81002 URINALYSIS NONAUTO W/O SCOPE: CPT | Performed by: NURSE PRACTITIONER

## 2023-10-24 PROCEDURE — 3079F DIAST BP 80-89 MM HG: CPT | Performed by: NURSE PRACTITIONER

## 2023-10-24 PROCEDURE — 99213 OFFICE O/P EST LOW 20 MIN: CPT | Performed by: NURSE PRACTITIONER

## 2023-10-24 PROCEDURE — 3075F SYST BP GE 130 - 139MM HG: CPT | Performed by: NURSE PRACTITIONER

## 2023-10-24 RX ORDER — MULTIVIT-MIN/IRON/FOLIC ACID/K 18-600-40
CAPSULE ORAL
COMMUNITY

## 2023-10-24 RX ORDER — OFATUMUMAB 20 MG/.4ML
INJECTION, SOLUTION SUBCUTANEOUS
COMMUNITY
Start: 2023-07-12

## 2023-10-24 NOTE — TELEPHONE ENCOUNTER
Patient booked an appt via [employer] for the following symptoms:    Lower - mid (right side) side pain when urinating.

## 2023-10-24 NOTE — TELEPHONE ENCOUNTER
Patient had schedule 10/26/23. Discussed symptoms. Has No urinary frequency. If no force, no pain; has pain if he forces urine. Denied discharge, no blood in urine. No flank pain. Offered sooner appt today with Anne-Marie Abts 4:40pm   Agreed to come in sooner.        Reason for Disposition   All other males with painful urination, or patient wants to be seen    Protocols used: Urination Pain - Male-A-OH

## 2023-11-06 ENCOUNTER — TELEPHONE (OUTPATIENT)
Dept: FAMILY MEDICINE CLINIC | Facility: CLINIC | Age: 33
End: 2023-11-06

## 2023-11-06 NOTE — TELEPHONE ENCOUNTER
Patient called back advised him:    No further questions    our urine culture was negative for growth of bacteria.   Please follow up if your symptoms persist.

## 2023-12-08 ENCOUNTER — OFFICE VISIT (OUTPATIENT)
Dept: FAMILY MEDICINE CLINIC | Facility: CLINIC | Age: 33
End: 2023-12-08
Payer: COMMERCIAL

## 2023-12-08 VITALS
SYSTOLIC BLOOD PRESSURE: 118 MMHG | BODY MASS INDEX: 23.98 KG/M2 | HEIGHT: 72 IN | DIASTOLIC BLOOD PRESSURE: 77 MMHG | WEIGHT: 177 LBS | HEART RATE: 68 BPM | TEMPERATURE: 97 F

## 2023-12-08 DIAGNOSIS — E55.9 VITAMIN D DEFICIENCY: ICD-10-CM

## 2023-12-08 DIAGNOSIS — G35 MULTIPLE SCLEROSIS (HCC): ICD-10-CM

## 2023-12-08 DIAGNOSIS — Z23 INFLUENZA VACCINE NEEDED: ICD-10-CM

## 2023-12-08 DIAGNOSIS — Z00.00 ADULT GENERAL MEDICAL EXAM: Primary | ICD-10-CM

## 2023-12-08 DIAGNOSIS — J30.89 OTHER ALLERGIC RHINITIS: ICD-10-CM

## 2023-12-08 PROCEDURE — 3074F SYST BP LT 130 MM HG: CPT | Performed by: FAMILY MEDICINE

## 2023-12-08 PROCEDURE — 3078F DIAST BP <80 MM HG: CPT | Performed by: FAMILY MEDICINE

## 2023-12-08 PROCEDURE — 3008F BODY MASS INDEX DOCD: CPT | Performed by: FAMILY MEDICINE

## 2023-12-08 PROCEDURE — 90686 IIV4 VACC NO PRSV 0.5 ML IM: CPT | Performed by: FAMILY MEDICINE

## 2023-12-08 PROCEDURE — 99395 PREV VISIT EST AGE 18-39: CPT | Performed by: FAMILY MEDICINE

## 2023-12-08 PROCEDURE — 90471 IMMUNIZATION ADMIN: CPT | Performed by: FAMILY MEDICINE

## 2023-12-09 ENCOUNTER — LAB ENCOUNTER (OUTPATIENT)
Dept: LAB | Facility: HOSPITAL | Age: 33
End: 2023-12-09
Attending: FAMILY MEDICINE
Payer: COMMERCIAL

## 2023-12-09 DIAGNOSIS — E55.9 VITAMIN D DEFICIENCY: ICD-10-CM

## 2023-12-09 DIAGNOSIS — Z00.00 ADULT GENERAL MEDICAL EXAM: ICD-10-CM

## 2023-12-09 LAB
ALBUMIN SERPL-MCNC: 4.7 G/DL (ref 3.2–4.8)
ALBUMIN/GLOB SERPL: 1.7 {RATIO} (ref 1–2)
ALP LIVER SERPL-CCNC: 90 U/L
ALT SERPL-CCNC: 17 U/L
ANION GAP SERPL CALC-SCNC: 6 MMOL/L (ref 0–18)
AST SERPL-CCNC: 15 U/L (ref ?–34)
BASOPHILS # BLD AUTO: 0.03 X10(3) UL (ref 0–0.2)
BASOPHILS NFR BLD AUTO: 0.5 %
BILIRUB SERPL-MCNC: 0.6 MG/DL (ref 0.3–1.2)
BUN BLD-MCNC: 17 MG/DL (ref 9–23)
BUN/CREAT SERPL: 14.9 (ref 10–20)
CALCIUM BLD-MCNC: 9.9 MG/DL (ref 8.7–10.4)
CHLORIDE SERPL-SCNC: 107 MMOL/L (ref 98–112)
CHOLEST SERPL-MCNC: 124 MG/DL (ref ?–200)
CO2 SERPL-SCNC: 29 MMOL/L (ref 21–32)
CREAT BLD-MCNC: 1.14 MG/DL
DEPRECATED RDW RBC AUTO: 45.4 FL (ref 35.1–46.3)
EGFRCR SERPLBLD CKD-EPI 2021: 87 ML/MIN/1.73M2 (ref 60–?)
EOSINOPHIL # BLD AUTO: 0.27 X10(3) UL (ref 0–0.7)
EOSINOPHIL NFR BLD AUTO: 4.8 %
ERYTHROCYTE [DISTWIDTH] IN BLOOD BY AUTOMATED COUNT: 13.1 % (ref 11–15)
FASTING PATIENT LIPID ANSWER: YES
FASTING STATUS PATIENT QL REPORTED: YES
GLOBULIN PLAS-MCNC: 2.7 G/DL (ref 2.8–4.4)
GLUCOSE BLD-MCNC: 87 MG/DL (ref 70–99)
HCT VFR BLD AUTO: 42.8 %
HDLC SERPL-MCNC: 40 MG/DL (ref 40–59)
HGB BLD-MCNC: 13.8 G/DL
IMM GRANULOCYTES # BLD AUTO: 0.01 X10(3) UL (ref 0–1)
IMM GRANULOCYTES NFR BLD: 0.2 %
LDLC SERPL CALC-MCNC: 71 MG/DL (ref ?–100)
LYMPHOCYTES # BLD AUTO: 1.47 X10(3) UL (ref 1–4)
LYMPHOCYTES NFR BLD AUTO: 26.2 %
MCH RBC QN AUTO: 30.3 PG (ref 26–34)
MCHC RBC AUTO-ENTMCNC: 32.2 G/DL (ref 31–37)
MCV RBC AUTO: 94.1 FL
MONOCYTES # BLD AUTO: 0.51 X10(3) UL (ref 0.1–1)
MONOCYTES NFR BLD AUTO: 9.1 %
NEUTROPHILS # BLD AUTO: 3.33 X10 (3) UL (ref 1.5–7.7)
NEUTROPHILS # BLD AUTO: 3.33 X10(3) UL (ref 1.5–7.7)
NEUTROPHILS NFR BLD AUTO: 59.2 %
NONHDLC SERPL-MCNC: 84 MG/DL (ref ?–130)
OSMOLALITY SERPL CALC.SUM OF ELEC: 295 MOSM/KG (ref 275–295)
PLATELET # BLD AUTO: 267 10(3)UL (ref 150–450)
POTASSIUM SERPL-SCNC: 4.2 MMOL/L (ref 3.5–5.1)
PROT SERPL-MCNC: 7.4 G/DL (ref 5.7–8.2)
RBC # BLD AUTO: 4.55 X10(6)UL
SODIUM SERPL-SCNC: 142 MMOL/L (ref 136–145)
TRIGL SERPL-MCNC: 59 MG/DL (ref 30–149)
TSI SER-ACNC: 1.83 MIU/ML (ref 0.55–4.78)
VIT D+METAB SERPL-MCNC: 52.1 NG/ML (ref 30–100)
VLDLC SERPL CALC-MCNC: 9 MG/DL (ref 0–30)
WBC # BLD AUTO: 5.6 X10(3) UL (ref 4–11)

## 2023-12-09 PROCEDURE — 36415 COLL VENOUS BLD VENIPUNCTURE: CPT

## 2023-12-09 PROCEDURE — 80053 COMPREHEN METABOLIC PANEL: CPT

## 2023-12-09 PROCEDURE — 84443 ASSAY THYROID STIM HORMONE: CPT

## 2023-12-09 PROCEDURE — 82306 VITAMIN D 25 HYDROXY: CPT

## 2023-12-09 PROCEDURE — 80061 LIPID PANEL: CPT

## 2023-12-09 PROCEDURE — 85025 COMPLETE CBC W/AUTO DIFF WBC: CPT

## 2024-01-14 ENCOUNTER — IMMUNIZATION (OUTPATIENT)
Dept: LAB | Age: 34
End: 2024-01-14
Attending: EMERGENCY MEDICINE
Payer: COMMERCIAL

## 2024-01-14 DIAGNOSIS — Z23 NEED FOR VACCINATION: Primary | ICD-10-CM

## 2024-01-14 PROCEDURE — 90480 ADMN SARSCOV2 VAC 1/ONLY CMP: CPT

## 2024-03-12 ENCOUNTER — HOSPITAL ENCOUNTER (OUTPATIENT)
Age: 34
Discharge: HOME | End: 2024-03-12
Payer: COMMERCIAL

## 2024-03-12 VITALS
DIASTOLIC BLOOD PRESSURE: 81 MMHG | TEMPERATURE: 98 F | HEART RATE: 51 BPM | RESPIRATION RATE: 16 BRPM | SYSTOLIC BLOOD PRESSURE: 119 MMHG | OXYGEN SATURATION: 98 %

## 2024-03-12 PROCEDURE — 99213 OFFICE O/P EST LOW 20 MIN: CPT

## 2024-03-12 PROCEDURE — 99214 OFFICE O/P EST MOD 30 MIN: CPT

## 2024-03-12 NOTE — ED INITIAL ASSESSMENT (HPI)
Patient arrived ambulatory to room c/o a rash around the umbilicus. Patient states he was recently on vacation swimming. No drainage from area. No pain. No fevers.

## 2024-03-12 NOTE — ED PROVIDER NOTES
Chief Complaint   Patient presents with    Rash       HPI:     Frank Bolaños is a 33 year old male who presents for evaluation of mild skin changes along the umbilicus over the last few days.  Notes was [place] last [place] and developed reaction over a few days.  Notes previous history of this with traveling chlorine based pools needing a topical antibiotic a few years ago with improvement in symptoms.  Denies any history of complicated skin infections including MRSA or associated fevers chills headache dizziness abdominal pain vomiting diarrhea dysuria or rash.  Denies any drainage or discharge      PFSH    PFSH asessment screens reviewed and agree.  Nurses notes reviewed I agree with documentation.    Family History   Problem Relation Age of Onset    Lipids Father     Cancer Father         back tumor    Diabetes Maternal Grandmother     Stroke Other     Heart Disease Neg     Glaucoma Neg     Macular degeneration Neg     Retinal detachment Neg      Family history reviewed with patient/caregiver and is not pertinent to presenting problem.  Social History     Socioeconomic History    Marital status:      Spouse name: Not on file    Number of children: 1    Years of education: Not on file    Highest education level: Not on file   Occupational History    Occupation:    Tobacco Use    Smoking status: Never    Smokeless tobacco: Never    Tobacco comments:     Socially    Substance and Sexual Activity    Alcohol use: Yes     Alcohol/week: 0.0 standard drinks of alcohol     Comment: weekends    Drug use: No    Sexual activity: Not on file   Other Topics Concern     Service Not Asked    Blood Transfusions Not Asked    Caffeine Concern Yes     Comment: coffee, 1 cup daily    Occupational Exposure Not Asked    Hobby Hazards Not Asked    Sleep Concern Not Asked    Stress Concern Not Asked    Weight Concern Not Asked    Special Diet Not Asked    Back Care Not Asked    Exercise  Not Asked    Bike Helmet Not Asked    Seat Belt Not Asked    Self-Exams Not Asked   Social History Narrative    Not on file     Social Determinants of Health     Financial Resource Strain: Not on file   Food Insecurity: Not on file   Transportation Needs: Not on file   Physical Activity: Not on file   Stress: Not on file   Social Connections: Not on file   Housing Stability: Not on file         ROS:   Positive for stated complaint: Umbilical rash.  All other systems reviewed and negative except as noted above.  Constitutional and Vital Signs Reviewed.      Physical Exam:     Findings:    /81   Pulse 51   Temp 97.7 °F (36.5 °C) (Temporal)   Resp 16   SpO2 98%   GENERAL: well developed, well nourished, well hydrated, no distress  SKIN: good skin turgor, no obvious rashes  EXTREMITIES: no cyanosis or edema. ZAMORANO without difficulty  GI: Mild erythema without induration or fluctuance along the umbilical region.  Yellow crusty discharge along the periphery.  Soft, non-tender, normal bowel sounds  HEAD: normocephalic, atraumatic  EYES: sclera non icteric bilateral,  NEURO: No focal deficits  PSYCH: Alert and oriented x3.  Answering questions appropriately.  Mood appropriate.    MDM/Assessment/Plan:   Orders for this encounter:    Orders Placed This Encounter    mupirocin 2 % External Ointment     Sig: Apply 1 Application topically 3 (three) times daily for 14 days.     Dispense:  1 each     Refill:  0       Labs performed this visit:  No results found for this or any previous visit (from the past 10 hour(s)).    MDM:  Patient exam most reflective of staph infection localized agreeable to topical versus oral antibiotics at this time yet area marked and instructed to readdress within 3 days if not improving symptoms with medication.  Happy with plan of care.  Alert nontoxic    Diagnosis:    ICD-10-CM    1. Infection of umbilical stump  P38.9           All results reviewed and discussed with patient.  See AVS for  detailed discharge instructions for your condition today.    Follow Up with:  Amari Moyer DO  [address] [facility] [address]  [place] [address]  [phone]    Schedule an appointment as soon as possible for a visit in 3 days  As needed

## 2024-05-25 ENCOUNTER — PATIENT MESSAGE (OUTPATIENT)
Dept: FAMILY MEDICINE CLINIC | Facility: CLINIC | Age: 34
End: 2024-05-25

## 2024-05-29 ENCOUNTER — TELEPHONE (OUTPATIENT)
Age: 34
End: 2024-05-29

## 2024-05-30 ENCOUNTER — HOSPITAL ENCOUNTER (OUTPATIENT)
Age: 34
Discharge: HOME | End: 2024-05-30

## 2024-05-30 VITALS
HEART RATE: 71 BPM | RESPIRATION RATE: 18 BRPM | OXYGEN SATURATION: 100 % | TEMPERATURE: 98 F | DIASTOLIC BLOOD PRESSURE: 80 MMHG | SYSTOLIC BLOOD PRESSURE: 135 MMHG

## 2024-05-30 DIAGNOSIS — Z11.3 SCREEN FOR STD (SEXUALLY TRANSMITTED DISEASE): ICD-10-CM

## 2024-05-30 DIAGNOSIS — N48.9 PENILE LESION: Primary | ICD-10-CM

## 2024-05-30 LAB
C TRACH DNA SPEC QL NAA+PROBE: NEGATIVE
N GONORRHOEA DNA SPEC QL NAA+PROBE: NEGATIVE
T PALLIDUM AB SER QL IA: NONREACTIVE

## 2024-05-30 PROCEDURE — 87591 N.GONORRHOEAE DNA AMP PROB: CPT | Performed by: PHYSICIAN ASSISTANT

## 2024-05-30 PROCEDURE — 99214 OFFICE O/P EST MOD 30 MIN: CPT | Performed by: PHYSICIAN ASSISTANT

## 2024-05-30 PROCEDURE — 87491 CHLMYD TRACH DNA AMP PROBE: CPT | Performed by: PHYSICIAN ASSISTANT

## 2024-05-30 PROCEDURE — 86780 TREPONEMA PALLIDUM: CPT | Performed by: PHYSICIAN ASSISTANT

## 2024-05-30 PROCEDURE — G0452 MOLECULAR PATHOLOGY INTERPR: HCPCS | Performed by: PHYSICIAN ASSISTANT

## 2024-05-30 PROCEDURE — 87529 HSV DNA AMP PROBE: CPT | Performed by: PHYSICIAN ASSISTANT

## 2024-05-30 NOTE — ED INITIAL ASSESSMENT (HPI)
Pt reports redness/irritation on penis that he noticed yesterday.   Denies urinary symptoms or discharge.

## 2024-05-30 NOTE — ED PROVIDER NOTES
Patient Seen in: [facility]    History     Chief Complaint   Patient presents with    Eval-G     Stated Complaint: eval-G    HPI    Frank Bolaños is a 34 year old male who presents with chief complaint of penile lesion.  Onset yesterday.   Patient denies fever, chills, abdominal pain, nausea, vomiting, diarrhea, constipation, melena, hematochezia, dysuria, hematuria, flank pain, scrotal pain, scrotal swelling, scrotal redness, penile discharge, rash, purulent drainage.        Past Medical History:    Anxiety    Myopia with astigmatism    Seasonal allergies    Vertigo       Past Surgical History:   Procedure Laterality Date    Laparoscopic appendectomy  08/18/2005    Open rx metacarpal fx Right 06/08/2012    third            Family History   Problem Relation Age of Onset    Lipids Father     Cancer Father         back tumor    Diabetes Maternal Grandmother     Stroke Other     Heart Disease Neg     Glaucoma Neg     Macular degeneration Neg     Retinal detachment Neg        Social History     Socioeconomic History    Marital status:     Number of children: 1   Occupational History    Occupation:    Tobacco Use    Smoking status: Never    Smokeless tobacco: Never    Tobacco comments:     Socially    Substance and Sexual Activity    Alcohol use: Yes     Alcohol/week: 0.0 standard drinks of alcohol     Comment: weekends    Drug use: No   Other Topics Concern    Caffeine Concern Yes     Comment: coffee, 1 cup daily       Review of Systems    Positive for stated complaint: eval-G  Other systems are as noted in HPI.  Constitutional and vital signs reviewed.      All other systems reviewed and negative except as noted above.    PSFH elements reviewed from today and agreed except as otherwise stated in HPI.    Physical Exam     ED Triage Vitals [05/30/24 0924]   /80   Pulse 71   Resp 18   Temp 97.7 °F (36.5 °C)   Temp src Temporal   SpO2 100 %   O2 Device None (Room air)        Current:/80   Pulse 71   Temp 97.7 °F (36.5 °C) (Temporal)   Resp 18   SpO2 100%     PULSE OX within normal limits on room air as interpreted by this provider.        Physical Exam    Constitutional: The patient is cooperative. Appears well-developed and well-nourished.  No acute distress.  Psychological: Alert, No abnormalities of mood, affect.  Head: Normocephalic/atraumatic.  Eyes: Pupils are equal round reactive to light.  Conjunctiva are within normal limits.  ENT: Oropharynx is clear.  Mucous membranes moist.  Neck: The neck is supple.  No meningeal signs.  Respiratory: Respiratory effort was normal.  There is no stridor.  Air entry is equal.  Cardiovascular: Regular rate and rhythm.  Capillary refill is brisk.    Gastrointestinal: Abdomen soft, nontender, nondistended.  There is no rebound tenderness or guarding.  No organomegaly is noted. No peritoneal signs.  Normal bowel sounds.  Genitourinary: A 5 mm x 3 mm erythematous lesion with overlying scabbed, crusted discharge is present at the penile shaft.  No penile meatus discharge.  Cremasteric reflex intact bilaterally.  No testicular tenderness, erythema or swelling.  Lymphatic: No gross lymphadenopathy noted.  Musculoskeletal: Musculoskeletal system is grossly intact.  There is no obvious deformity.  Neurological: Gross motor movement is intact in all 4 extremities.  Patient exhibits normal speech.  Skin: Skin is normal to inspection, except as documented.  Warm and dry.  No obvious bruising.  No obvious rash.          ED Course     Labs Reviewed   HSV 1/2 SUBTYPE BY PCR (LESION-ONLY)   T PALLIDUM SCREENING CASCADE   CHLAMYDIA/GONOCOCCUS, JOHNNY       MDM     Labs listed above pending.    Physical exam remained stable as previously documented.  Physical exam findings discussed with patient.    I have given the patient instructions regarding their diagnoses, expectations, follow up, and ER precautions. I explained to the patient that emergent  conditions may arise and to go to the ER for new, worsening or any persistent conditions. I've explained the importance of following up with their doctor as instructed. The patient verbalized understanding of the discharge instructions and plan.        Disposition and Plan     Clinical Impression:  1. Penile lesion    2. Screen for STD (sexually transmitted disease)        Disposition:  Discharge    Follow-up:  Amari Moyer DO  [address] [place] [id number]  [phone]    Call in 1 day  For follow-up      Medications Prescribed:  Current Discharge Medication List

## 2024-05-31 ENCOUNTER — TELEPHONE (OUTPATIENT)
Dept: FAMILY MEDICINE CLINIC | Facility: CLINIC | Age: 34
End: 2024-05-31

## 2024-05-31 NOTE — TELEPHONE ENCOUNTER
Patient contacts clinic reporting he was seen in immediate care yesterday and wants to make sure he is on the right care plan.  Treated with mupirocin ointment for penile lesion.  STD testing performed, all results so far negative.  Herpes cultures still pending.  He will await final test results and follow up accordingly.

## 2024-06-04 LAB
HSV 1 NAA: NEGATIVE
HSV 1 NAA: NEGATIVE
HSV 2 NAA: NEGATIVE
HSV 2 NAA: NEGATIVE

## 2024-06-04 NOTE — TELEPHONE ENCOUNTER
Follow up call placed to patient. HSV testing negative.  He states lesion is improving with mupirocin ointment.  He will keep clinic apprised of his progress.

## 2024-11-08 ENCOUNTER — OFFICE VISIT (OUTPATIENT)
Dept: FAMILY MEDICINE CLINIC | Facility: CLINIC | Age: 34
End: 2024-11-08
Payer: COMMERCIAL

## 2024-11-08 VITALS
WEIGHT: 189 LBS | TEMPERATURE: 98 F | HEART RATE: 61 BPM | HEIGHT: 72 IN | BODY MASS INDEX: 25.6 KG/M2 | DIASTOLIC BLOOD PRESSURE: 77 MMHG | SYSTOLIC BLOOD PRESSURE: 121 MMHG

## 2024-11-08 DIAGNOSIS — K13.70 ORAL MUCOSAL LESION: Primary | ICD-10-CM

## 2024-11-08 PROCEDURE — 3074F SYST BP LT 130 MM HG: CPT | Performed by: FAMILY MEDICINE

## 2024-11-08 PROCEDURE — 99213 OFFICE O/P EST LOW 20 MIN: CPT | Performed by: FAMILY MEDICINE

## 2024-11-08 PROCEDURE — 3008F BODY MASS INDEX DOCD: CPT | Performed by: FAMILY MEDICINE

## 2024-11-08 PROCEDURE — 3078F DIAST BP <80 MM HG: CPT | Performed by: FAMILY MEDICINE

## 2024-11-08 NOTE — PROGRESS NOTES
Patient ID: Frank Bolaños is a 34 year old male.    HPI  Chief Complaint   Patient presents with    Mouth Lesions     White spot on left side of mouth      Last seen on 08/15/2024.    Pt c/o white spot on the left side of the mouth for the last 8 weeks. He recalls possibly biting the inside of the cheek. It doesn't hurt but he can feel it as he talks. I discussed with him and referred it to ENT.  No tobacco use.    Wt Readings from Last 6 Encounters:   11/08/24 189 lb   08/15/24 182 lb   05/20/24 182 lb 9.6 oz   12/08/23 177 lb   10/24/23 179 lb 8 oz   03/14/23 230 lb       BMI Readings from Last 6 Encounters:   11/08/24 25.63 kg/m²   08/15/24 24.68 kg/m²   05/20/24 24.77 kg/m²   12/08/23 24.01 kg/m²   10/24/23 24.34 kg/m²   03/14/23 31.19 kg/m²       BP Readings from Last 6 Encounters:   11/08/24 121/77   08/15/24 113/78   05/30/24 135/80   05/20/24 128/80   03/12/24 119/81   12/08/23 118/77         Review of Systems   Respiratory:  Negative for shortness of breath.    Cardiovascular:  Negative for chest pain.           Medical History:      Past Medical History:    Anxiety    Myopia with astigmatism    Seasonal allergies    Vertigo       Past Surgical History:   Procedure Laterality Date    Laparoscopic appendectomy  08/18/2005    Open rx metacarpal fx Right 06/08/2012    third          Current Outpatient Medications   Medication Sig Dispense Refill    sertraline 50 MG Oral Tab Take 1.5 tablets (75 mg total) by mouth daily. (for mood) 135 tablet 1    KESIMPTA 20 MG/0.4ML Subcutaneous Solution Auto-injector       Multiple Vitamins-Minerals (MULTI VITAMIN/MINERALS) Oral Tab Take by mouth.      Cholecalciferol (VITAMIN D) 50 MCG (2000 UT) Oral Cap Take by mouth.       Allergies:Allergies[1]     Physical Exam:       Physical Exam  Blood pressure 143/84, pulse 61, temperature 97.5 °F (36.4 °C), temperature source Temporal, height 6' (1.829 m), weight 189 lb.    Vitals:    11/08/24 1026 11/08/24 1040   BP: 143/84  121/77   Pulse: 61    Temp: 97.5 °F (36.4 °C)    TempSrc: Temporal    Weight: 189 lb    Height: 6' (1.829 m)         Physical Exam   Constitutional: Patient is oriented to person, place, and time. Patient appears well-developed and well-nourished. No distress.   Head: Normocephalic.   Right Ear: Tympanic membrane, external ear and ear canal normal.   Left Ear: Tympanic membrane, external ear and ear canal normal.   Throat: Oropharynx without exudate. Slightly raised white lesion on the left buccal mucosa that is about 5 mm by 7 mm in size and slightly raised. No gingivitis, no abscess. Tonsils look normal. The lesion is firm with palpation but not tender.   Eyes: Conjunctivae and EOM are normal.   Neck: Normal range of motion. No thyromegaly present.   Cardiovascular: Normal rate, regular rhythm and normal heart sounds.    Pulmonary/Chest: Effort normal and breath sounds normal. No respiratory distress.   Lymphadenopathy: Patient has no cervical adenopathy.  Neurological: Patient is alert and oriented to person, place, and time.   Skin: Skin is warm.   Psychiatry: Normal mood and affect.    Vitals reviewed.           Assessment/Plan:      Diagnoses and all orders for this visit:    Oral mucosal lesion  -     ENT - INTERNAL  He needs to see ENT.  Referral done.      Referrals (if applicable)  Orders Placed This Encounter   Procedures    ENT - INTERNAL     If he is busy, go ahead and see one of his partners.     Referral Priority:   Routine     Referral Type:   OFFICE VISIT     Referred to Provider:   Shiva Shen DO     Requested Specialty:   OTOLARYNGOLOGY     Number of Visits Requested:   3       Follow up if symptoms persist.  Take medicine (if given) as prescribed.  Approach to treatment discussed and patient/family member understands and agrees to plan.     No follow-ups on file.    There are no Patient Instructions on file for this visit.    Lou Berry    11/8/2024    By signing my name below, I,  Lou Berry,  attest that this documentation has been prepared under the direction and in the presence of Amari Moyer DO.   Electronically Signed: Lou Berry, 11/8/2024, 10:21 AM.      I, Amari Moyer DO,  personally performed the services described in this documentation. All medical record entries made by the scribe were at my direction and in my presence.  I have reviewed the chart and discharge instructions (if applicable) and agree that the record reflects my personal performance and is accurate and complete.  Amari Moyer DO, 11/8/2024, 11:03 AM                [1] No Known Allergies

## 2024-11-12 ENCOUNTER — PATIENT MESSAGE (OUTPATIENT)
Dept: OTOLARYNGOLOGY | Facility: CLINIC | Age: 34
End: 2024-11-12

## 2025-01-30 ENCOUNTER — LAB ENCOUNTER (OUTPATIENT)
Dept: LAB | Age: 35
End: 2025-01-30
Attending: FAMILY MEDICINE
Payer: COMMERCIAL

## 2025-01-30 ENCOUNTER — OFFICE VISIT (OUTPATIENT)
Dept: FAMILY MEDICINE CLINIC | Facility: CLINIC | Age: 35
End: 2025-01-30
Payer: COMMERCIAL

## 2025-01-30 VITALS
HEIGHT: 72 IN | HEART RATE: 64 BPM | SYSTOLIC BLOOD PRESSURE: 119 MMHG | TEMPERATURE: 97 F | BODY MASS INDEX: 26.82 KG/M2 | DIASTOLIC BLOOD PRESSURE: 81 MMHG | WEIGHT: 198 LBS

## 2025-01-30 DIAGNOSIS — F41.0 PANIC ATTACK: ICD-10-CM

## 2025-01-30 DIAGNOSIS — F41.9 ANXIETY: ICD-10-CM

## 2025-01-30 DIAGNOSIS — Z00.00 ADULT GENERAL MEDICAL EXAM: Primary | ICD-10-CM

## 2025-01-30 DIAGNOSIS — J30.89 OTHER ALLERGIC RHINITIS: ICD-10-CM

## 2025-01-30 DIAGNOSIS — E55.9 VITAMIN D DEFICIENCY: ICD-10-CM

## 2025-01-30 DIAGNOSIS — Z00.00 ADULT GENERAL MEDICAL EXAM: ICD-10-CM

## 2025-01-30 DIAGNOSIS — G35 MULTIPLE SCLEROSIS (HCC): ICD-10-CM

## 2025-01-30 LAB
ALBUMIN SERPL-MCNC: 5.3 G/DL (ref 3.2–4.8)
ALBUMIN/GLOB SERPL: 1.8 {RATIO} (ref 1–2)
ALP LIVER SERPL-CCNC: 77 U/L
ALT SERPL-CCNC: 25 U/L
ANION GAP SERPL CALC-SCNC: 11 MMOL/L (ref 0–18)
AST SERPL-CCNC: 22 U/L (ref ?–34)
BASOPHILS # BLD AUTO: 0.06 X10(3) UL (ref 0–0.2)
BASOPHILS NFR BLD AUTO: 1 %
BILIRUB SERPL-MCNC: 0.7 MG/DL (ref 0.3–1.2)
BUN BLD-MCNC: 17 MG/DL (ref 9–23)
BUN/CREAT SERPL: 13.6 (ref 10–20)
CALCIUM BLD-MCNC: 10.3 MG/DL (ref 8.7–10.4)
CHLORIDE SERPL-SCNC: 108 MMOL/L (ref 98–112)
CHOLEST SERPL-MCNC: 211 MG/DL (ref ?–200)
CO2 SERPL-SCNC: 25 MMOL/L (ref 21–32)
CREAT BLD-MCNC: 1.25 MG/DL
DEPRECATED RDW RBC AUTO: 46.3 FL (ref 35.1–46.3)
EGFRCR SERPLBLD CKD-EPI 2021: 77 ML/MIN/1.73M2 (ref 60–?)
EOSINOPHIL # BLD AUTO: 0.49 X10(3) UL (ref 0–0.7)
EOSINOPHIL NFR BLD AUTO: 8.6 %
ERYTHROCYTE [DISTWIDTH] IN BLOOD BY AUTOMATED COUNT: 13 % (ref 11–15)
FASTING PATIENT LIPID ANSWER: YES
FASTING STATUS PATIENT QL REPORTED: YES
GLOBULIN PLAS-MCNC: 2.9 G/DL (ref 2–3.5)
GLUCOSE BLD-MCNC: 96 MG/DL (ref 70–99)
HCT VFR BLD AUTO: 46.9 %
HDLC SERPL-MCNC: 47 MG/DL (ref 40–59)
HGB BLD-MCNC: 15 G/DL
IMM GRANULOCYTES # BLD AUTO: 0.02 X10(3) UL (ref 0–1)
IMM GRANULOCYTES NFR BLD: 0.3 %
LDLC SERPL CALC-MCNC: 141 MG/DL (ref ?–100)
LYMPHOCYTES # BLD AUTO: 1.57 X10(3) UL (ref 1–4)
LYMPHOCYTES NFR BLD AUTO: 27.4 %
MCH RBC QN AUTO: 30.7 PG (ref 26–34)
MCHC RBC AUTO-ENTMCNC: 32 G/DL (ref 31–37)
MCV RBC AUTO: 96.1 FL
MONOCYTES # BLD AUTO: 0.5 X10(3) UL (ref 0.1–1)
MONOCYTES NFR BLD AUTO: 8.7 %
NEUTROPHILS # BLD AUTO: 3.08 X10 (3) UL (ref 1.5–7.7)
NEUTROPHILS # BLD AUTO: 3.08 X10(3) UL (ref 1.5–7.7)
NEUTROPHILS NFR BLD AUTO: 54 %
NONHDLC SERPL-MCNC: 164 MG/DL (ref ?–130)
OSMOLALITY SERPL CALC.SUM OF ELEC: 299 MOSM/KG (ref 275–295)
PLATELET # BLD AUTO: 233 10(3)UL (ref 150–450)
POTASSIUM SERPL-SCNC: 5 MMOL/L (ref 3.5–5.1)
PROT SERPL-MCNC: 8.2 G/DL (ref 5.7–8.2)
RBC # BLD AUTO: 4.88 X10(6)UL
SODIUM SERPL-SCNC: 144 MMOL/L (ref 136–145)
TRIGL SERPL-MCNC: 126 MG/DL (ref 30–149)
TSI SER-ACNC: 1.73 UIU/ML (ref 0.55–4.78)
VIT D+METAB SERPL-MCNC: 27.7 NG/ML (ref 30–100)
VLDLC SERPL CALC-MCNC: 23 MG/DL (ref 0–30)
WBC # BLD AUTO: 5.7 X10(3) UL (ref 4–11)

## 2025-01-30 PROCEDURE — 36415 COLL VENOUS BLD VENIPUNCTURE: CPT

## 2025-01-30 PROCEDURE — 3079F DIAST BP 80-89 MM HG: CPT | Performed by: FAMILY MEDICINE

## 2025-01-30 PROCEDURE — 85025 COMPLETE CBC W/AUTO DIFF WBC: CPT

## 2025-01-30 PROCEDURE — 82306 VITAMIN D 25 HYDROXY: CPT

## 2025-01-30 PROCEDURE — 3074F SYST BP LT 130 MM HG: CPT | Performed by: FAMILY MEDICINE

## 2025-01-30 PROCEDURE — 80061 LIPID PANEL: CPT

## 2025-01-30 PROCEDURE — 99395 PREV VISIT EST AGE 18-39: CPT | Performed by: FAMILY MEDICINE

## 2025-01-30 PROCEDURE — 3008F BODY MASS INDEX DOCD: CPT | Performed by: FAMILY MEDICINE

## 2025-01-30 PROCEDURE — 80053 COMPREHEN METABOLIC PANEL: CPT

## 2025-01-30 PROCEDURE — 84443 ASSAY THYROID STIM HORMONE: CPT

## 2025-01-30 NOTE — PROGRESS NOTES
Patient ID: Frank Bolaños is a 34 year old male.    HPI  Chief Complaint   Patient presents with    Routine Physical     Last physical done on 12/08/2023.    Pt does not smoke. He does office work and is .     I reviewed pt's labs and vitals. He consults with Dr. Wang, neurology, for Hx multiple sclerosis which he states is stable. Pt was diagnosed in March, 2023 and has once monthly injections of Kesimpta 20 mg/0.4 mL. He takes sertraline for mood with relief. He consulted with a therapist in the past but didn't feel it was the right fit. I discussed with him and gave a referral to [facility]. He takes OTC Vitamin D inconsistently but takes a multivitamin daily.        Pt feels healthy and has no concerns at this time.     Health Maintenance   Topic Date Due    COVID-19 Vaccine (6 - 2024-25 season) 09/01/2024    Influenza Vaccine (1) 10/01/2024    Annual Physical  12/08/2024    Annual Depression Screening  01/01/2025    DTaP,Tdap,and Td Vaccines (7 - Td or Tdap) 03/23/2028    Pneumococcal Vaccine: Birth to 50yrs  Aged Out    Meningococcal B Vaccine  Aged Out       =======================================================    Lab Results   Component Value Date    WBC 5.6 12/09/2023    RBC 4.55 12/09/2023    HGB 13.8 12/09/2023    HCT 42.8 12/09/2023    .0 12/09/2023    MPV 7.0 (L) 03/23/2018    MCV 94.1 12/09/2023    MCH 30.3 12/09/2023    MCHC 32.2 12/09/2023    RDW 13.1 12/09/2023    NEPRELIM 3.33 12/09/2023    NEPERCENT 59.2 12/09/2023    LYPERCENT 26.2 12/09/2023    MOPERCENT 9.1 12/09/2023    EOPERCENT 4.8 12/09/2023    BAPERCENT 0.5 12/09/2023    NE 3.33 12/09/2023    LYMABS 1.47 12/09/2023    MOABSO 0.51 12/09/2023    EOABSO 0.27 12/09/2023    BAABSO 0.03 12/09/2023       Lab Results   Component Value Date    GLU 87 12/09/2023    BUN 17 12/09/2023    BUNCREA 14.9 12/09/2023    CREATSERUM 1.14 12/09/2023    ANIONGAP 6 12/09/2023    GFRNAA >60 03/23/2018    GFRAA >60 03/23/2018    CA 9.9  12/09/2023    OSMOCALC 295 12/09/2023    ALKPHO 90 12/09/2023    AST 15 12/09/2023    ALT 17 12/09/2023    BILT 0.6 12/09/2023    TP 7.4 12/09/2023    ALB 4.7 12/09/2023    GLOBULIN 2.7 (L) 12/09/2023    AGRATIO 1.8 11/14/2014     12/09/2023    K 4.2 12/09/2023     12/09/2023    CO2 29.0 12/09/2023       Lab Results   Component Value Date    GLU 87 12/09/2023    BUN 17 12/09/2023    CREATSERUM 1.14 12/09/2023    BUNCREA 14.9 12/09/2023    ANIONGAP 6 12/09/2023    GFRAA >60 03/23/2018    GFRNAA >60 03/23/2018    CA 9.9 12/09/2023     12/09/2023    K 4.2 12/09/2023     12/09/2023    CO2 29.0 12/09/2023    OSMOCALC 295 12/09/2023       Lab Results   Component Value Date    COLORUR Yellow 11/12/2013    CLARITY Cloudy (A) 11/12/2013    SPECGRAVITY 1.010 10/24/2023    GLUUR NEG 11/12/2013    BILUR NEG 11/12/2013    KETUR NEG 11/12/2013    BLOODURINE TR (A) 11/12/2013    PHURINE 7.0 10/24/2023    PROUR NEG 11/12/2013    UROBILINOGEN <2.0 11/12/2013    NITRITE Negative 10/24/2023    LEUUR SM (A) 11/12/2013    ASCACIDURINE NEG 11/12/2013    WBCUR 18 (H) 11/12/2013    RBCUR 6 (H) 11/12/2013    BACUR FEW (A) 11/12/2013    CAOXUR MOD 11/12/2013    MICCOM Completed 11/12/2013       Lab Results   Component Value Date    CHOLEST 124 12/09/2023    TRIG 59 12/09/2023    HDL 40 12/09/2023    LDL 71 12/09/2023    VLDL 9 12/09/2023    TCHDLRATIO 4.5 11/14/2014    NONHDLC 84 12/09/2023     TSH (mIU/mL)   Date Value   12/09/2023 1.831       Lab Results   Component Value Date    VITD 52.1 12/09/2023         =======================================================    Wt Readings from Last 6 Encounters:   01/30/25 198 lb   11/08/24 189 lb   08/15/24 182 lb   05/20/24 182 lb 9.6 oz   12/08/23 177 lb   10/24/23 179 lb 8 oz               BMI Readings from Last 6 Encounters:   01/30/25 26.85 kg/m²   11/08/24 25.63 kg/m²   08/15/24 24.68 kg/m²   05/20/24 24.77 kg/m²   12/08/23 24.01 kg/m²   10/24/23 24.34 kg/m²       BP  Readings from Last 6 Encounters:   01/30/25 119/81   11/08/24 121/77   08/15/24 113/78   05/30/24 135/80   05/20/24 128/80   03/12/24 119/81         Review of Systems   Respiratory:  Negative for shortness of breath.    Cardiovascular:  Negative for chest pain.         Past Medical History:    Anxiety    Myopia with astigmatism    Seasonal allergies    Vertigo       Past Surgical History:   Procedure Laterality Date    Laparoscopic appendectomy  08/18/2005    Open rx metacarpal fx Right 06/08/2012    third       Social History     Socioeconomic History    Marital status:      Spouse name: Not on file    Number of children: 1    Years of education: Not on file    Highest education level: Not on file   Occupational History    Occupation:    Tobacco Use    Smoking status: Never    Smokeless tobacco: Never    Tobacco comments:     Socially    Substance and Sexual Activity    Alcohol use: Yes     Alcohol/week: 0.0 standard drinks of alcohol     Comment: weekends    Drug use: No    Sexual activity: Not on file   Other Topics Concern     Service Not Asked    Blood Transfusions Not Asked    Caffeine Concern Yes     Comment: coffee, 1 cup daily    Occupational Exposure Not Asked    Hobby Hazards Not Asked    Sleep Concern Not Asked    Stress Concern Not Asked    Weight Concern Not Asked    Special Diet Not Asked    Back Care Not Asked    Exercise Not Asked    Bike Helmet Not Asked    Seat Belt Not Asked    Self-Exams Not Asked   Social History Narrative    Not on file     Social Drivers of Health     Financial Resource Strain: Low Risk  (11/8/2024)    Received from [facility]    Overall Financial Resource Strain (CARDIA)     Difficulty of Paying Living Expenses: Not very hard   Food Insecurity: No Food Insecurity (11/8/2024)    Received from [facility]    Hunger Vital Sign     Worried About Running Out of Food in the Last Year: Never true     Ran Out of  Food in the Last Year: Never true   Transportation Needs: No Transportation Needs (11/8/2024)    Received from [facility]    PRAPARE - Transportation     Lack of Transportation (Medical): No     Lack of Transportation (Non-Medical): No   Physical Activity: Not on file   Stress: Not on file   Social Connections: Not on file   Housing Stability: Unknown (11/8/2024)    Received from [facility]    Housing Stability Vital Sign     Unable to Pay for Housing in the Last Year: No     Number of Times Moved in the Last Year: Not on file     Homeless in the Last Year: Not on file          Current Outpatient Medications   Medication Sig Dispense Refill    sertraline 50 MG Oral Tab Take 1.5 tablets (75 mg total) by mouth daily. (for mood) 135 tablet 1    KESIMPTA 20 MG/0.4ML Subcutaneous Solution Auto-injector       Multiple Vitamins-Minerals (MULTI VITAMIN/MINERALS) Oral Tab Take by mouth.      Cholecalciferol (VITAMIN D) 50 MCG (2000 UT) Oral Cap Take by mouth.       Allergies:Allergies[1]   PHYSICAL EXAM:   Physical Exam    Physical Exam   Constitutional: He appears well-developed and well-nourished. No distress.   Head: Normocephalic.   Right Ear: Tympanic membrane and ear canal normal.   Left Ear: Tympanic membrane and ear canal normal.   Nose: No mucosal edema or rhinorrhea.  Mouth/Throat: Oropharynx is clear and moist and mucous membranes are normal.   Eyes: Conjunctivae and EOM are normal. Pupils are equal, round, and reactive to light.   Neck: Normal range of motion. Neck supple. No thyromegaly present.   Cardiovascular: Normal rate, regular rhythm and no murmur heard.   Pulmonary/Chest: Effort normal and breath sounds normal. No respiratory distress.   Abdominal: Soft. Bowel sounds are normal. There is no hepatosplenomegaly. There is no tenderness.   Lymphadenopathy: He has no cervical adenopathy.   Neurological: He is alert and oriented to person, place, and time. He has  normal reflexes. No cranial nerve deficit.   Skin: Skin is warm and dry. No rash noted.   Psychiatric: He has a normal mood and affect.    Vitals reviewed.    Blood pressure 125/88, pulse 64, temperature 97.4 °F (36.3 °C), temperature source Temporal, height 6' (1.829 m), weight 198 lb.    Vitals:    01/30/25 0850 01/30/25 0859   BP: 125/88 119/81   Pulse: 64    Temp: 97.4 °F (36.3 °C)    TempSrc: Temporal    Weight: 198 lb    Height: 6' (1.829 m)              ASSESSMENT/PLAN:     Diagnoses and all orders for this visit:    Adult general medical exam  -     CBC With Differential With Platelet; Future  -     Comp Metabolic Panel (14); Future  -     Lipid Panel; Future  -     Assay, Thyroid Stim Hormone; Future    Multiple sclerosis (HCC)  He sees Dr. Youngblood his neurologist.  He is doing very well.  No complaints.  Other allergic rhinitis  Takes medication if needed during the spring or summer  Vitamin D deficiency  -     Vitamin D; Future  Takes over-the-counter vitamin D    Anxiety  -     sertraline 50 MG Oral Tab; Take 1.5 tablets (75 mg total) by mouth daily. (for mood)  -     OP REFERRAL TO [facility]  States sertraline works wonderful.  He did see a therapist at 1 time but would like to see perhaps someone different.  I did another referral through [facility].  Panic attack  -     sertraline 50 MG Oral Tab; Take 1.5 tablets (75 mg total) by mouth daily. (for mood)  -     OP REFERRAL TO [facility]        Referrals (if applicable)  No orders of the defined types were placed in this encounter.      Follow up if symptoms persist.  Take medicine (if given) as prescribed.  Approach to treatment discussed and patient/family member understands and agrees to plan.     No follow-ups on file.    There are no Patient Instructions on file for this visit.    Lou Berry    1/30/2025    By signing my name below, I, Lou Berry,  attest that this documentation has been prepared under the direction and in the presence of  Amrai Moyer DO.   Electronically Signed: Lou Kristi, 1/30/2025, 8:51 AM.    I, Amari Moyer DO,  personally performed the services described in this documentation. All medical record entries made by the scribe were at my direction and in my presence.  I have reviewed the chart and discharge instructions (if applicable) and agree that the record reflects my personal performance and is accurate and complete.  Amari Moyer DO, 1/30/2025, 9:12 AM                  [1] No Known Allergies

## (undated) NOTE — LETTER
Patient Name: Jacoby Flores  : 3/28/1990  MRN: [id number]  Patient Address: [address] [place] [address]      Coronavirus Disease 2019 (COVID-19)     [place] [address] is committed to the safety and well-being of our patients, symptoms carefully. If your symptoms get worse, call your healthcare provider immediately. 3. Get rest and stay hydrated. 4. If you have a medical appointment, call the healthcare provider ahead of time and tell them that you have or may have COVID-19. without the use of fever-reducing medications; and  · Improvement in respiratory symptoms (e.g., cough, shortness of breath); and  · At least 10 days have passed since symptoms first appeared OR if asymptomatic patient or date of symptom onset is unclear t convalescent plasma donors must:    · Have had a confirmed diagnosis of COVID-19  · Be symptom-free for at least 14 days*    *Some people will be required to have a repeat COVID-19 test in order to be eligible to donate.  If you’re instructed by Fadumo Daley ricci Post-COVID conditions to be random. Researchers are trying to identify similarities between people with a Post-COVID condition to better understand if there are risk factors. How do I prevent a Post-COVID condition?   The best way to prevent the long-t

## (undated) NOTE — LETTER
Pery[facility] Do  [address]   [address]  [address] [facility], [address] [facility]       04/06/18        Patient: Kellie Abrams   YOB: 1990   Date of Visit: 4/5/2018       Dear  Dr. Mehdi Brambila DO,      Thank you for referring Kellie Abrams to [facility]

## (undated) NOTE — ED AVS SNAPSHOT
[facility] in [place] [address].  [place] [address]    Phone:  [phone]    Fax:  [address] [facility] [address]   MRN: [id number]    Department:  [facility] in [address] [place] [address]   Date of Visi may not be covered by your plan. It is possible that the physician may not participate in your health insurance plan. This may result in a lower benefit level being available to you or other limited reimbursement.   The physician may seek payment directly If you have been prescribed any medication(s), please fill your prescription right away and begin taking the medication(s) as directed.   If you believe that any of the medications or instructions on this list is different from what your Primary Care doctor harming yourself, contact [facility] at [phone]. - If you don’t have insurance, Leena Singh has partnered with Patient [address] to help you get signed up for insurance coverage.   Patient [place]